# Patient Record
Sex: FEMALE | Race: NATIVE HAWAIIAN OR OTHER PACIFIC ISLANDER | Employment: PART TIME | URBAN - METROPOLITAN AREA
[De-identification: names, ages, dates, MRNs, and addresses within clinical notes are randomized per-mention and may not be internally consistent; named-entity substitution may affect disease eponyms.]

---

## 2019-02-28 ENCOUNTER — HOSPITAL ENCOUNTER (OUTPATIENT)
Dept: MAMMOGRAPHY | Age: 64
Discharge: HOME | End: 2019-03-05
Payer: COMMERCIAL

## 2019-02-28 DIAGNOSIS — Z12.31 VISIT FOR SCREENING MAMMOGRAM: ICD-10-CM

## 2019-02-28 PROCEDURE — 77067 SCR MAMMO BI INCL CAD: CPT

## 2021-01-07 ENCOUNTER — OFFICE VISIT (OUTPATIENT)
Dept: PRIMARY CARE CLINIC | Age: 66
End: 2021-01-07
Payer: COMMERCIAL

## 2021-01-07 DIAGNOSIS — Z20.828 EXPOSURE TO SARS-ASSOCIATED CORONAVIRUS: Primary | ICD-10-CM

## 2021-01-07 PROCEDURE — G8420 CALC BMI NORM PARAMETERS: HCPCS | Performed by: NURSE PRACTITIONER

## 2021-01-07 PROCEDURE — 99211 OFF/OP EST MAY X REQ PHY/QHP: CPT | Performed by: NURSE PRACTITIONER

## 2021-01-07 PROCEDURE — G8428 CUR MEDS NOT DOCUMENT: HCPCS | Performed by: NURSE PRACTITIONER

## 2021-01-07 NOTE — PATIENT INSTRUCTIONS

## 2021-01-08 LAB — SARS-COV-2: NOT DETECTED

## 2021-03-12 NOTE — PROGRESS NOTES
Patient reached ____ yes  __X___ no   VM instructions left __X__ yes   phone number _[phone]_______                                ____ no-office notified          Date _3/23/21________  Time _1100______  Arrival _0930_____    Nothing to eat or drink after midnight-follow your doctors prep instructions-this may include taking a second dose of your prep after midnight  Responsible adult 25 or older to stay on site while you are here-drive you home-stay with you after  Follow any instructions your doctors office has given you  Bring a complete list of all your medications and supplements including name,dose,how often taken the day of your procedure  If you normally take the following medications in the morning please do so the AM of your procedure with a small sip of water       Heart,blood pressure,seizure,thyroid or breathing medications-use your inhalers       DO NOT take blood pressure medications ending in \"nidhi\" or \"pril\" the AM of procedure or evening prior  Take half or your normal dose of any long acting insulins the night before your procedure-do not take any diabetic medications the AM of procedure  Follow your doctors instructions regarding stopping or taking  any blood thinners-if you do not have instructions-call them  Any questions call your doctor  Other ______________________________________________________________      COVID TEST     __ done- where ____  __ scheduled _____ where ___  _X_ other _ PT TO SCHEDULE FOR 3/15/21 OR 3/16/21_________        VISITOR POLICY(subject to change)         There is a one visitor policy at [facility] for all surgeries and endoscopies. Whether the visitor can stay or will be asked to wait in the car will depend on the current policy and if social distancing can be maintained. The policy is subject to change at any time. Please make sure the visitor has a cell phone that is on,charged and able to accept calls, as this may be the way that the staff communicates with them.Pain management is NO VISITOR policyThe patients ride is expected to remain in the car with a cell phone for communication. If the ride is leaving the hospital grounds please make sure they are back in time for pickup. Have the patient inform the staff on arrival what their rides plans are while the patient is in the facility. At the MAIN there is one visitor allowed. Please note that the visitor policy is subject to change.

## 2021-03-23 ENCOUNTER — HOSPITAL ENCOUNTER (OUTPATIENT)
Age: 66
Setting detail: OUTPATIENT SURGERY
Discharge: HOME | End: 2021-03-23
Attending: SURGERY | Admitting: SURGERY
Payer: COMMERCIAL

## 2021-03-23 ENCOUNTER — ANESTHESIA EVENT (OUTPATIENT)
Dept: ENDOSCOPY | Age: 66
End: 2021-03-23
Payer: COMMERCIAL

## 2021-03-23 ENCOUNTER — ANESTHESIA (OUTPATIENT)
Dept: ENDOSCOPY | Age: 66
End: 2021-03-23
Payer: COMMERCIAL

## 2021-03-23 VITALS
OXYGEN SATURATION: 99 % | WEIGHT: 110 LBS | HEART RATE: 58 BPM | RESPIRATION RATE: 13 BRPM | SYSTOLIC BLOOD PRESSURE: 156 MMHG | BODY MASS INDEX: 21.6 KG/M2 | DIASTOLIC BLOOD PRESSURE: 55 MMHG | TEMPERATURE: 97.5 F | HEIGHT: 60 IN

## 2021-03-23 VITALS
OXYGEN SATURATION: 100 % | RESPIRATION RATE: 20 BRPM | SYSTOLIC BLOOD PRESSURE: 123 MMHG | DIASTOLIC BLOOD PRESSURE: 57 MMHG

## 2021-03-23 LAB — SARS-COV-2, NAAT: NOT DETECTED

## 2021-03-23 PROCEDURE — 3609010300 HC COLONOSCOPY W/BIOPSY SINGLE/MULTIPLE: Performed by: SURGERY

## 2021-03-23 PROCEDURE — 7100000000 HC PACU RECOVERY - FIRST 15 MIN: Performed by: SURGERY

## 2021-03-23 PROCEDURE — 7100000011 HC PHASE II RECOVERY - ADDTL 15 MIN: Performed by: SURGERY

## 2021-03-23 PROCEDURE — 2580000003 HC RX 258: Performed by: NURSE ANESTHETIST, CERTIFIED REGISTERED

## 2021-03-23 PROCEDURE — 3700000001 HC ADD 15 MINUTES (ANESTHESIA): Performed by: SURGERY

## 2021-03-23 PROCEDURE — 6360000002 HC RX W HCPCS: Performed by: NURSE ANESTHETIST, CERTIFIED REGISTERED

## 2021-03-23 PROCEDURE — 87635 SARS-COV-2 COVID-19 AMP PRB: CPT

## 2021-03-23 PROCEDURE — 7100000010 HC PHASE II RECOVERY - FIRST 15 MIN: Performed by: SURGERY

## 2021-03-23 PROCEDURE — 2500000003 HC RX 250 WO HCPCS: Performed by: NURSE ANESTHETIST, CERTIFIED REGISTERED

## 2021-03-23 PROCEDURE — 7100000001 HC PACU RECOVERY - ADDTL 15 MIN: Performed by: SURGERY

## 2021-03-23 PROCEDURE — 2709999900 HC NON-CHARGEABLE SUPPLY: Performed by: SURGERY

## 2021-03-23 PROCEDURE — 88305 TISSUE EXAM BY PATHOLOGIST: CPT

## 2021-03-23 PROCEDURE — 3700000000 HC ANESTHESIA ATTENDED CARE: Performed by: SURGERY

## 2021-03-23 RX ORDER — PROPOFOL 10 MG/ML
INJECTION, EMULSION INTRAVENOUS CONTINUOUS PRN
Status: DISCONTINUED | OUTPATIENT
Start: 2021-03-23 | End: 2021-03-23 | Stop reason: SDUPTHER

## 2021-03-23 RX ORDER — LIDOCAINE HYDROCHLORIDE 20 MG/ML
INJECTION, SOLUTION INFILTRATION; PERINEURAL PRN
Status: DISCONTINUED | OUTPATIENT
Start: 2021-03-23 | End: 2021-03-23 | Stop reason: SDUPTHER

## 2021-03-23 RX ORDER — PROPOFOL 10 MG/ML
INJECTION, EMULSION INTRAVENOUS PRN
Status: DISCONTINUED | OUTPATIENT
Start: 2021-03-23 | End: 2021-03-23 | Stop reason: SDUPTHER

## 2021-03-23 RX ORDER — SODIUM CHLORIDE 9 MG/ML
INJECTION, SOLUTION INTRAVENOUS CONTINUOUS PRN
Status: DISCONTINUED | OUTPATIENT
Start: 2021-03-23 | End: 2021-03-23 | Stop reason: SDUPTHER

## 2021-03-23 RX ADMIN — PROPOFOL 100 MG: 10 INJECTION, EMULSION INTRAVENOUS at 11:41

## 2021-03-23 RX ADMIN — LIDOCAINE HYDROCHLORIDE 100 MG: 20 INJECTION, SOLUTION INFILTRATION; PERINEURAL at 11:35

## 2021-03-23 RX ADMIN — SODIUM CHLORIDE: 9 INJECTION, SOLUTION INTRAVENOUS at 11:14

## 2021-03-23 RX ADMIN — PROPOFOL 50 MG: 10 INJECTION, EMULSION INTRAVENOUS at 11:35

## 2021-03-23 RX ADMIN — PROPOFOL 160 MCG/KG/MIN: 10 INJECTION, EMULSION INTRAVENOUS at 11:35

## 2021-03-23 NOTE — PROGRESS NOTES
Patient's  is here. Pt dc'd per wheelchair. Patient discharged home with belongings.  taking stable pt home.

## 2021-03-23 NOTE — H&P
Subjective:     Bartolo Peters is a 72 y.o. female who was referred for screening colonoscopy    Patient's medications, allergies, past medical, surgical, social and family histories were reviewed and updated as appropriate. Past medical history:  has a past medical history of Anesthesia, Prediabetes, and Thyroid disease. Past surgical history:  has a past surgical history that includes  section. Past social history:  reports that she has never smoked. She does not have any smokeless tobacco history on file. She reports previous alcohol use. She reports previous drug use. Past family history: family history is not on file. Allergies: No Known Allergies  Current medications: No current facility-administered medications for this encounter. Facility-Administered Medications Ordered in Other Encounters:     0.9 % sodium chloride infusion, , , Continuous PRN, Donna Pop, KAYLEY - CRNA, New Bag at 21 1114    Review of Systems  A comprehensive review of systems was negative. Objective:     BP (!) 171/60   Pulse 64   Temp 97.5 °F (36.4 °C) (Temporal)   Resp 18   Ht 5' (1.524 m)   Wt 110 lb (49.9 kg)   SpO2 100%   BMI 21.48 kg/m²   General appearance: alert, appears stated age and cooperative  Eyes: conjunctivae/corneas clear. PERRL, EOM's intact. Fundi benign. Ears: normal TM's and external ear canals both ears  Heart: regular rate and rhythm, S1, S2 normal, no murmur, click, rub or gallop  Abdomen: soft, non-tender; bowel sounds normal; no masses,  no organomegaly  Lungs: clear to auscultation bilaterally  Rectal: normal tone    Plan:     1. Colonoscopy.

## 2021-03-23 NOTE — PROCEDURES
[facility]                     [address] [facility], [address] [facility]                               COLONOSCOPY REPORT    PATIENT NAME: Colleen Altamirano                       :        1955  MED REC NO:   [id number]                          ROOM:  ACCOUNT NO:   [de-identified]                           ADMIT DATE: 2021  PROVIDER:     Ivania Sinha MD    DATE OF PROCEDURE:  2021    PREOPERATIVE DIAGNOSIS:  Screening for colon cancer. POSTOPERATIVE DIAGNOSES:  Sigmoid colon polyps and diverticulosis of the  sigmoid colon. SURGEON:  Ivania Sinha MD    SEDATION:  MAC. BLOOD LOSS:  Minimal.    OPERATIVE PROCEDURE:  The patient was placed in the left lateral  position. Olympus colonoscope was inserted all the way up to the cecum. Cecum and ileocecal valve were within normal limits. Coming back, the  whole colon was examined. Her prep was good. She had moderate  diverticulosis of the sigmoid colon. There were two tiny 5 mm polyps  seen, which were hyperplastic polyps, were removed with biopsy forceps. No cancer was seen. Colon mucosa was normal.  She tolerated the  procedure well and left the endoscopy room in satisfactory condition.         Charbel Gordillo MD    D: 2021 12:12:59       T: 2021 12:44:51     [id number]  Job#: [id number]     Doc#: [id number]    CC:  Celina Mota MD

## 2021-03-23 NOTE — ANESTHESIA PRE PROCEDURE
Department of Anesthesiology  Preprocedure Note       Name:  Oneida Civil   Age:  72 y.o.  :  1955                                          MRN:  [id number]         Date:  3/23/2021      Surgeon: Hal Partida):  Darryle Cool, MD    Procedure: Procedure(s):  COLONOSCOPY DIAGNOSTIC    Medications prior to admission:   Prior to Admission medications    Medication Sig Start Date End Date Taking? Authorizing Provider   levothyroxine (SYNTHROID) 75 MCG tablet Take 75 mcg by mouth Daily    Historical Provider, MD   NONFORMULARY Chinese herb daily    Historical Provider, MD       Current medications:    No current facility-administered medications for this encounter. Allergies:  No Known Allergies    Problem List:  There is no problem list on file for this patient. Past Medical History:        Diagnosis Date    Anesthesia     states \"almost \" due to too much sedation with childbirth    Prediabetes     Thyroid disease     hypothyroidism       Past Surgical History:        Procedure Laterality Date     SECTION      x2       Social History:    Social History     Tobacco Use    Smoking status: Not on file   Substance Use Topics    Alcohol use: Not on file                                Counseling given: Not Answered      Vital Signs (Current): There were no vitals filed for this visit.                                            BP Readings from Last 3 Encounters:   No data found for BP       NPO Status:                                                                                 BMI:   Wt Readings from Last 3 Encounters:   No data found for Wt     There is no height or weight on file to calculate BMI.    CBC: No results found for: WBC, RBC, HGB, HCT, MCV, RDW, PLT    CMP:   Lab Results   Component Value Date     2012    K 4.1 2012     2012    CO2 25 2012    BUN 10 2012    CREATININE 0.7 2012    GFRAA >60 2012    AGRATIO 1.1 2012 GLUCOSE 121 03/29/2012    PROT 7.6 03/29/2012    CALCIUM 9.3 03/29/2012    BILITOT 1.00 03/29/2012    ALKPHOS 79 03/29/2012    AST 34 03/29/2012    ALT 34 03/29/2012       POC Tests: No results for input(s): POCGLU, POCNA, POCK, POCCL, POCBUN, POCHEMO, POCHCT in the last 72 hours. Coags: No results found for: PROTIME, INR, APTT    HCG (If Applicable): No results found for: PREGTESTUR, PREGSERUM, HCG, HCGQUANT     ABGs: No results found for: PHART, PO2ART, IRZ6ZRU, OEY3QRX, BEART, J9NXKZBG     Type & Screen (If Applicable):  No results found for: LABABO, LABRH    Drug/Infectious Status (If Applicable):  No results found for: HIV, HEPCAB    COVID-19 Screening (If Applicable):   Lab Results   Component Value Date    COVID19 Not Detected 01/07/2021           Anesthesia Evaluation  Patient summary reviewed and Nursing notes reviewed history of anesthetic complications (hx total spinal during delivery): Airway: Mallampati: II  TM distance: >3 FB   Neck ROM: full  Mouth opening: > = 3 FB Dental:          Pulmonary:                              Cardiovascular:  Exercise tolerance: poor (<4 METS),                     Neuro/Psych:               GI/Hepatic/Renal:             Endo/Other:                     Abdominal:           Vascular:                                        Anesthesia Plan      MAC     ASA 2       Induction: intravenous. MIPS: Prophylactic antiemetics administered. Anesthetic plan and risks discussed with patient. Plan discussed with CRNA.                   Sridhar Benz MD   3/23/2021

## 2021-03-23 NOTE — PROGRESS NOTES
Pt drove herself to procedure. Pt was unaware she will need ride home. Pt informed she can not drive home if she receives sedation. Pt called and confirmed with her  that he will come pick her up, and will be able to stay with her at home. Charge nurse aware of situation. 's phone number placed in paper chart.

## 2021-03-23 NOTE — ANESTHESIA POSTPROCEDURE EVALUATION
Department of Anesthesiology  Postprocedure Note    Patient: Heather Soliman  MRN: [id number]  YOB: 1955  Date of evaluation: 3/23/2021  Time:  12:10 PM     Procedure Summary     Date: 03/23/21 Room / Location: [address] [place] [address]    Anesthesia Start: 1130 Anesthesia Stop: 9216    Procedure: COLONOSCOPY POLYPECTOMY SNARE/COLD BIOPSY (N/A ) Diagnosis: (SCREENING Z12.11)    Surgeons: Luba Pascal MD Responsible Provider: Melissa Shepherd MD    Anesthesia Type: MAC ASA Status: 2          Anesthesia Type: MAC    Berna Phase I: Berna Score: 9    Berna Phase II:      Last vitals: Reviewed and per EMR flowsheets.        Anesthesia Post Evaluation    Patient location during evaluation: PACU  Patient participation: complete - patient participated  Level of consciousness: awake  Airway patency: patent  Nausea & Vomiting: no vomiting  Complications: no  Cardiovascular status: hemodynamically stable  Respiratory status: acceptable  Hydration status: euvolemic

## 2021-03-23 NOTE — BRIEF OP NOTE
Brief Postoperative Note      Patient: Francisco Pollard  YOB: 1955  MRN: [id number]    Date of Procedure: 3/23/2021    Pre-Op Diagnosis: SCREENING Z12.11    Post-Op Diagnosis: Same plus sigmoid polyps and diverticulosiis of sigmoid colon       Procedure(s):  COLONOSCOPY POLYPECTOMY SNARE/COLD BIOPSY  colonscopy up to cecum with biopsy polypectomies of sigmoid colon  Surgeon(s):  Ruth Awad MD    Assistant:  * No surgical staff found *    Anesthesia: Monitor Anesthesia Care    Estimated Blood Loss (mL): Minimal    Complications: None    Specimens:   ID Type Source Tests Collected by Time Destination   A : Bx- sigmoid colon polyp Tissue Tissue SURGICAL PATHOLOGY Ruth Awad MD 3/23/2021 1155        Implants:  * No implants in log *      Drains: * No LDAs found *    Findings: as above    Electronically signed by Ruth Awad MD on 3/23/2021 at 11:59 AM

## 2021-03-23 NOTE — PROGRESS NOTES
Discharge instructions reviewed with patient, pt alert and oreinted. All home medications have been reviewed, questions answered and patient verbalized understanding. Discharge instructions signed.   Waiting for patient's  to arrive at the hospital.

## 2021-03-23 NOTE — PROGRESS NOTES
Patient arrived from endo to pacu. On room air. SB on the monitor.  VSS    Electronically signed by Rehan Sanchez RN on 3/23/2021 at 9030

## 2023-06-20 ENCOUNTER — HOSPITAL ENCOUNTER (OUTPATIENT)
Dept: PHYSICAL THERAPY | Age: 68
Setting detail: THERAPIES SERIES
Discharge: HOME | End: 2023-06-20
Payer: COMMERCIAL

## 2023-06-20 PROCEDURE — 97112 NEUROMUSCULAR REEDUCATION: CPT

## 2023-06-20 PROCEDURE — 97116 GAIT TRAINING THERAPY: CPT

## 2023-06-20 PROCEDURE — 97110 THERAPEUTIC EXERCISES: CPT

## 2023-06-20 NOTE — FLOWSHEET NOTE
transfers, ambulation, and ascending / descending stairs. Charges:  Timed Code Treatment Minutes: 43   Total Treatment Minutes: 43     [] EVAL - LOW (49446)   [] EVAL - MOD (60712)  [] EVAL - HIGH (44077)  [] RE-EVAL (30683)  [x] TE (77466) x  1    [] Ionto  [x] NMR (32971) x  1    [] Vaso  [] Manual (14522) x      [] Ultrasound  [] TA x       [] Mech Traction (05421)  [x] Gait Training x  1   [] ES (un) (89614):   [] Aquatic therapy x   [] Other:   [] Group:     GOALS:   Patient stated goal: improve walking   [] Progressing: [] Met: [] Not Met: [] Adjusted     Therapist goals for Patient:   Short Term Goals: To be achieved in: 2 weeks  1. Independent in HEP and progression per patient tolerance, in order to prevent re-injury. [] Progressing: [] Met: [] Not Met: [] Adjusted  2. Patient will have a decrease in pain to facilitate improvement in movement, function, and ADLs as indicated by Functional Deficits. [] Progressing: [] Met: [] Not Met: [] Adjusted     Long Term Goals: To be achieved in: 8 weeks  1. Patient to improve TUG time 18 sec or less to demonstrate improved fall risk to assist with reaching prior level of function. [] Progressing: [] Met: [] Not Met: [] Adjusted  2. Patient will improve 5x STS to 12 sec or less to demonstrate increased LE endurance for improved functional mobility. [] Progressing: [] Met: [] Not Met: [] Adjusted  3. Patient will return to functional activities including climbing 1 flight of stairs with reciprocal pattern and 1 HR to improve safety and independence in the home. [] Progressing: [] Met: [] Not Met: [] Adjusted  4. Patient will be able to ambulate at least 600 feet without AD and SBA/SUP to progress to full time independent ambulation. [] Progressing: [] Met: [] Not Met: [] Adjusted         Overall Progression Towards Functional goals/ Treatment Progress Update:  [] Patient is progressing as expected towards functional goals listed.     [] Progression is

## 2023-06-21 ENCOUNTER — HOSPITAL ENCOUNTER (OUTPATIENT)
Dept: PHYSICAL THERAPY | Age: 68
Setting detail: THERAPIES SERIES
Discharge: HOME | End: 2023-06-21
Payer: COMMERCIAL

## 2023-06-21 PROCEDURE — 97112 NEUROMUSCULAR REEDUCATION: CPT

## 2023-06-21 PROCEDURE — 97110 THERAPEUTIC EXERCISES: CPT

## 2023-06-21 NOTE — FLOWSHEET NOTE
[address] [facility] Physical Therapy  Phone: [phone]   Fax: [phone]    Physical Therapy Daily Treatment Note    Date:  2023     Patient Name:  Lianna Kay    :  1955  MRN: [id number]  Medical Diagnosis:  Other cerebral infarction [I63.89]  Treatment Diagnosis: L LE weakness and decreased ROM s/p CVA, impaired gait and balance, difficulty with stairs, increased risk for falls, decreased durance, assistance required for gait and ADLs  Insurance/Certification information:  PT Insurance Information: [facility] Choice Plus: no copay, no auth, 60 visits combined HARD MAX; 85% coinsurance  Physician Information:  Priya Valente MD    Plan of care signed (Y/N): []  Yes [x]  No     Date of Patient follow up with Physician:      Progress Report: []  Yes  [x]  No     Date Range for reporting period:  Beginnin2023  Ending:       Progress report due (10 Rx/or 30 days whichever is less): visit #10 or 3/68/88    Recertification due (POC duration/ or 90 days whichever is less): visit #16 or 23     Visit # Insurance Allowable Auth required? Date Range   3/16 60 visits combined   HARD MAX []  Yes  [x]  No NA     Latex Allergy:  [x]NO      []YES  Preferred Language for Healthcare:   [x]English       []Other:    Functional Scale/Test Evaluation 2023 30 day  60 day  Discharge    Tinetti Assessment        TUG 37.7 sec      5X sts 15.8 sec          Pain level:  0/10  Location:  n/a    SUBJECTIVE:  Patient reports she was not sore last night.       OBJECTIVE:       RESTRICTIONS/PRECAUTIONS: L ankle DF assist brace, L arm hypotonia     Interventions/Exercises:   Therapeutic Exercises (10531) Resistance / level Sets/sec Reps Notes   Scifit stepper with UEs L 1 4 min  , : PT assist with keeping L hand on handle   IB   HR  2 x 30\" B  30\"     Supine:  - AA L hip flexion  - bridge w/ shoulder flexion  - bridge with hip abd        Lime TB    2  2  2   10  10  10

## 2023-06-27 ENCOUNTER — HOSPITAL ENCOUNTER (OUTPATIENT)
Dept: OCCUPATIONAL THERAPY | Age: 68
Setting detail: THERAPIES SERIES
Discharge: HOME | End: 2023-06-27
Payer: COMMERCIAL

## 2023-06-27 ENCOUNTER — HOSPITAL ENCOUNTER (OUTPATIENT)
Dept: PHYSICAL THERAPY | Age: 68
Setting detail: THERAPIES SERIES
Discharge: HOME | End: 2023-06-27
Payer: COMMERCIAL

## 2023-06-27 PROCEDURE — 97116 GAIT TRAINING THERAPY: CPT

## 2023-06-27 PROCEDURE — 97112 NEUROMUSCULAR REEDUCATION: CPT

## 2023-06-27 PROCEDURE — 97110 THERAPEUTIC EXERCISES: CPT

## 2023-06-29 ENCOUNTER — HOSPITAL ENCOUNTER (OUTPATIENT)
Dept: PHYSICAL THERAPY | Age: 68
Setting detail: THERAPIES SERIES
Discharge: HOME | End: 2023-06-29
Payer: COMMERCIAL

## 2023-06-29 ENCOUNTER — HOSPITAL ENCOUNTER (OUTPATIENT)
Dept: OCCUPATIONAL THERAPY | Age: 68
Setting detail: THERAPIES SERIES
Discharge: HOME | End: 2023-06-29
Payer: COMMERCIAL

## 2023-06-29 PROCEDURE — 97116 GAIT TRAINING THERAPY: CPT

## 2023-06-29 PROCEDURE — 97112 NEUROMUSCULAR REEDUCATION: CPT

## 2023-06-29 PROCEDURE — 97110 THERAPEUTIC EXERCISES: CPT

## 2023-06-30 ENCOUNTER — HOSPITAL ENCOUNTER (OUTPATIENT)
Dept: OCCUPATIONAL THERAPY | Age: 68
Setting detail: THERAPIES SERIES
Discharge: HOME | End: 2023-06-30
Payer: COMMERCIAL

## 2023-06-30 ENCOUNTER — HOSPITAL ENCOUNTER (OUTPATIENT)
Dept: PHYSICAL THERAPY | Age: 68
Setting detail: THERAPIES SERIES
Discharge: HOME | End: 2023-06-30
Payer: COMMERCIAL

## 2023-06-30 PROCEDURE — 97116 GAIT TRAINING THERAPY: CPT

## 2023-06-30 PROCEDURE — 97535 SELF CARE MNGMENT TRAINING: CPT

## 2023-06-30 PROCEDURE — 97112 NEUROMUSCULAR REEDUCATION: CPT

## 2023-07-03 ENCOUNTER — HOSPITAL ENCOUNTER (OUTPATIENT)
Dept: OCCUPATIONAL THERAPY | Age: 68
Setting detail: THERAPIES SERIES
Discharge: HOME | End: 2023-07-03
Payer: COMMERCIAL

## 2023-07-03 ENCOUNTER — HOSPITAL ENCOUNTER (OUTPATIENT)
Dept: PHYSICAL THERAPY | Age: 68
Setting detail: THERAPIES SERIES
Discharge: HOME | End: 2023-07-03
Payer: COMMERCIAL

## 2023-07-03 PROCEDURE — 97530 THERAPEUTIC ACTIVITIES: CPT

## 2023-07-03 PROCEDURE — 97112 NEUROMUSCULAR REEDUCATION: CPT

## 2023-07-03 PROCEDURE — 97110 THERAPEUTIC EXERCISES: CPT

## 2023-07-03 PROCEDURE — 97116 GAIT TRAINING THERAPY: CPT

## 2023-07-03 PROCEDURE — 97535 SELF CARE MNGMENT TRAINING: CPT

## 2023-07-03 NOTE — FLOWSHEET NOTE
[address] [facility] Physical Therapy  Phone: [phone]   Fax: [phone]    Physical Therapy Daily Treatment Note    Date:  2023     Patient Name:  Nikki Romero    :  1955  MRN: [id number]  Medical Diagnosis:  Other cerebral infarction [I63.89]  Treatment Diagnosis: L LE weakness and decreased ROM s/p CVA, impaired gait and balance, difficulty with stairs, increased risk for falls, decreased durance, assistance required for gait and ADLs  Insurance/Certification information:  PT Insurance Information: [facility] Choice Plus: no copay, no auth, 60 visits combined HARD MAX; 85% coinsurance  Physician Information:  Dagmar Parson MD    Plan of care signed (Y/N): []  Yes [x]  No     Date of Patient follow up with Physician:      Progress Report: []  Yes  [x]  No     Date Range for reporting period:  Beginnin2023  Ending:       Progress report due (10 Rx/or 30 days whichever is less): visit #10 or     Recertification due (POC duration/ or 90 days whichever is less): visit #16 or 23     Visit # Insurance Allowable Auth required? Date Range    60 visits combined   HARD MAX []  Yes  [x]  No NA     Latex Allergy:  [x]NO      []YES  Preferred Language for Healthcare:   [x]English       []Other:    Functional Scale/Test Evaluation 2023 30 day  60 day  Discharge    Tinetti Assessment        TUG 37.7 sec      5X sts 15.8 sec          Pain level:  0/10  Location:  n/a    SUBJECTIVE:  Patient reports her L arm needed \"heavy work\" and OT did that for her.  Went out to eat yesterday a     OBJECTIVE:       RESTRICTIONS/PRECAUTIONS: L ankle DF assist brace, L arm hypotonia     Interventions/Exercises:   Therapeutic Exercises (42329) Resistance / level Sets/sec Reps Notes   Scifit stepper with UEs , : PT assist with keeping L hand on handle   IB   HR    Supine:  - AA L hip flexion  - bridge w/ shoulder flexion  - bridge with hip abd    : very

## 2023-07-05 ENCOUNTER — HOSPITAL ENCOUNTER (OUTPATIENT)
Dept: OCCUPATIONAL THERAPY | Age: 68
Setting detail: THERAPIES SERIES
Discharge: HOME | End: 2023-07-05
Payer: COMMERCIAL

## 2023-07-05 ENCOUNTER — HOSPITAL ENCOUNTER (OUTPATIENT)
Dept: PHYSICAL THERAPY | Age: 68
Setting detail: THERAPIES SERIES
Discharge: HOME | End: 2023-07-05
Payer: COMMERCIAL

## 2023-07-05 PROCEDURE — 97116 GAIT TRAINING THERAPY: CPT

## 2023-07-05 PROCEDURE — 97112 NEUROMUSCULAR REEDUCATION: CPT

## 2023-07-05 PROCEDURE — 97530 THERAPEUTIC ACTIVITIES: CPT

## 2023-07-05 PROCEDURE — 97110 THERAPEUTIC EXERCISES: CPT

## 2023-07-05 NOTE — FLOWSHEET NOTE
[address] [facility] Physical Therapy  Phone: [phone]   Fax: [phone]    Physical Therapy Daily Treatment Note    Date:  2023     Patient Name:  Yeimy Perez    :  1955  MRN: [id number]  Medical Diagnosis:  Other cerebral infarction [I63.89]  Treatment Diagnosis: L LE weakness and decreased ROM s/p CVA, impaired gait and balance, difficulty with stairs, increased risk for falls, decreased durance, assistance required for gait and ADLs  Insurance/Certification information:  PT Insurance Information: [facility] Choice Plus: no copay, no auth, 60 visits combined HARD MAX; 85% coinsurance  Physician Information:  José Miguel Hartley MD    Plan of care signed (Y/N): []  Yes [x]  No     Date of Patient follow up with Physician:      Progress Report: []  Yes  [x]  No     Date Range for reporting period:  Beginnin2023  Ending:       Progress report due (10 Rx/or 30 days whichever is less): visit #10 or 8/09/10    Recertification due (POC duration/ or 90 days whichever is less): visit #16 or 23     Visit # Insurance Allowable Auth required? Date Range    60 visits combined   HARD MAX []  Yes  [x]  No NA     Latex Allergy:  [x]NO      []YES  Preferred Language for Healthcare:   [x]English       []Other:    Functional Scale/Test Evaluation 2023 30 day  60 day  Discharge    Tinetti Assessment        TUG 37.7 sec      5X sts 15.8 sec          Pain level:  0/10  Location:  n/a    SUBJECTIVE:  Pt reports  OT worked her arm really well but her hand is taking a long time to come back. César Frame her bike yesterday at home and was able to go around in a full revolution.       OBJECTIVE:       RESTRICTIONS/PRECAUTIONS: L ankle DF assist brace, L arm hypotonia     Interventions/Exercises:   Therapeutic Exercises (40469) Resistance / level Sets/sec Reps Notes   Scifit stepper with UEs , : PT assist with keeping L hand on handle   IB   HR    Supine:  - AA L hip

## 2023-07-05 NOTE — FLOWSHEET NOTE
swelling/inflammation/restriction, improving soft tissue extensibility and allowing for proper ROM for normal function with self care, reaching, carrying, lifting, house/yardwork, driving/computer work  [] Comments:    ADL Training:  [x] (78729) Provided self-care/home management training related to activities of daily living and compensatory training, and/or use of adaptive equipment   [] Comments:     Splinting:  [] Fabrication of:   [] (00448) Orthotic/Prosthetic Management, subsequent encounter  [] (97118) Orthotic management and training (fitting and assessment)  [] Comments:      Charges:  Timed Code Treatment Minutes: 45   Total Treatment Minutes: 45     [] EVAL (LOW) 05056   [] OT Re-eval (25727)  [] EVAL (MOD) 97510   [] EVAL (HIGH) 0496 97 06 31       [] Sabas (68581) x     [] CYGHK(61285)  [x] NMR (91583) x    2 [] Estim (attended) (00596)   [] Manual ([id number]) x     [] US (49508)  [] TA () x     [] Paraffin (21875)  [x] ADL  (88 [phone] 60) x   1 [] Splint/L code:    [] Estim (unattended) (22 [id number])  [] Fluidotherapy (27878)  [] Other:    GOALS:  Patient stated goal: improve function in left ue and drive again  [x] Progressing: [] Met: [] Not Met: [] Adjusted     Therapist goals for Patient:   Short Term Goals: To be achieved in: 30 days  1. Pt will use left ue as functional stabilizer during games with family   [x] Progressing: [] Met: [] Not Met: [] Adjusted  2. Pt will demonstrate use of left ue as bimanual assist carrying objects during ADL and IADL  [x] Progressing: [] Met: [] Not Met: [] Adjusted     Long Term Goals: To be achieved by discharge     2. Pt will report a QuickDASH Symptom Severity Scale score of  50% or less indicating increased safety and functional independence in desired occupational pursuits by discharge. [x] Progressing: [] Met: [] Not Met: [] Adjusted       Progression Towards Functional goals:  [x] Patient is progressing as expected towards functional goals listed.     [] Progression is slowed

## 2023-07-07 ENCOUNTER — HOSPITAL ENCOUNTER (OUTPATIENT)
Dept: PHYSICAL THERAPY | Age: 68
Setting detail: THERAPIES SERIES
Discharge: HOME | End: 2023-07-07
Payer: COMMERCIAL

## 2023-07-07 ENCOUNTER — HOSPITAL ENCOUNTER (OUTPATIENT)
Dept: OCCUPATIONAL THERAPY | Age: 68
Setting detail: THERAPIES SERIES
Discharge: HOME | End: 2023-07-07
Payer: COMMERCIAL

## 2023-07-07 PROCEDURE — 97110 THERAPEUTIC EXERCISES: CPT

## 2023-07-07 PROCEDURE — 97112 NEUROMUSCULAR REEDUCATION: CPT

## 2023-07-07 PROCEDURE — 97116 GAIT TRAINING THERAPY: CPT

## 2023-07-07 NOTE — FLOWSHEET NOTE
[address] [facility] Occupational Therapy  [address] [facility] [address] [place] [address], [facility]  Phone: [phone]   Fax: [phone]      Occupational Therapy Daily Treatment Note  Date:  2023    Patient: Sophia Culver   : 1955   MRN: [id number]  Referring Physician: Dr. Krystle Potter MD           Medical Diagnosis Information:  left hemiplegia    Date of Injury: 23  Date of Surgery:  n/a                                      Insurance information: [place] of care sent to provider:      []Faxed   [x]Co-signature    (attempts: 1[] 2[] 3[])       Progress Report: []  Yes  [x]  No     Date Range for reporting period:  Beginnin2023  Ending: end of POC    Progress report due (10 Rx/or 30 days whichever is less): visit #10 or 23, DUE next session    Recertification due (POC duration/ or 90 days whichever is less): visit #18 or 23     Visit # Insurance Allowable Auth required? Date Range    60 combined all therapy discliplines []  Yes  [x]  No N/a         Latex Allergy:  [x]No      []Yes  Pacemaker:  [x] No       [] Yes     Preferred Language for Healthcare:   [x]English       []other:    Pain level:  1/10, L anterior shoulder     SUBJECTIVE:  Pt reports her shoulder has been feeling better. Functional Disability Index:  Quick DASH: pt has completed at home and will bring to next session (23)    OBJECTIVE: See eval      RESTRICTIONS/PRECAUTIONS: none noted on order     Exercises/Interventions:  Treatment focus on inhibition of abnormal movement patterns and neuromuscular control to improve LUE ROM, strength, and functional use.     Warm up 4 minutes UBE in stance with cues to decrease L shoulder elevation and increase force of hand grasp  In stance at stairs, pt grasped hand rail with L hand to complete slides up/down with cues to avoid shoulder elevation, IR, and abduction, progressing to staggered stance on steps; much improved activation of

## 2023-07-07 NOTE — FLOWSHEET NOTE
454 [facility] Physical Therapy  Phone: [phone]   Fax: [phone]    Physical Therapy Daily Treatment Note    Date:  2023     Patient Name:  Bandar Chambers    :  1955  MRN: [id number]  Medical Diagnosis:  Other cerebral infarction [I63.89]  Treatment Diagnosis: L LE weakness and decreased ROM s/p CVA, impaired gait and balance, difficulty with stairs, increased risk for falls, decreased durance, assistance required for gait and ADLs  Insurance/Certification information:  PT Insurance Information: [facility] Choice Plus: no copay, no auth, 60 visits combined HARD MAX; 85% coinsurance  Physician Information:  Manan Capps MD    Plan of care signed (Y/N): []  Yes [x]  No     Date of Patient follow up with Physician:      Progress Report: []  Yes  [x]  No     Date Range for reporting period:  Beginnin2023  Ending:       Progress report due (10 Rx/or 30 days whichever is less): visit #10 or 04    Recertification due (POC duration/ or 90 days whichever is less): visit #16 or 23     Visit # Insurance Allowable Auth required? Date Range    60 visits combined   HARD MAX []  Yes  [x]  No NA     Latex Allergy:  [x]NO      []YES  Preferred Language for Healthcare:   [x]English       []Other:    Functional Scale/Test Evaluation 2023 30 day  60 day  Discharge    Tinetti Assessment        TUG 37.7 sec      5X sts 15.8 sec          Pain level:  0/10  Location:  n/a    SUBJECTIVE:  Pt reports at home when riding her bike, she feels less \"jerk\" back of her L knee. She states it still happens but it is more mild.        OBJECTIVE:       RESTRICTIONS/PRECAUTIONS: L ankle DF assist brace, L arm hypotonia     Interventions/Exercises:   Therapeutic Exercises (90946) Resistance / level Sets/sec Reps Notes   Scifit stepper with UEs , : PT assist with keeping L hand on handle   IB   HR    Supine:  - AA L hip flexion  - bridge w/ shoulder flexion  -

## 2023-07-10 ENCOUNTER — HOSPITAL ENCOUNTER (OUTPATIENT)
Dept: OCCUPATIONAL THERAPY | Age: 68
Setting detail: THERAPIES SERIES
Discharge: HOME | End: 2023-07-10
Payer: COMMERCIAL

## 2023-07-10 ENCOUNTER — HOSPITAL ENCOUNTER (OUTPATIENT)
Dept: PHYSICAL THERAPY | Age: 68
Setting detail: THERAPIES SERIES
Discharge: HOME | End: 2023-07-10
Payer: COMMERCIAL

## 2023-07-10 PROCEDURE — 97112 NEUROMUSCULAR REEDUCATION: CPT

## 2023-07-10 PROCEDURE — 97116 GAIT TRAINING THERAPY: CPT

## 2023-07-10 PROCEDURE — 97530 THERAPEUTIC ACTIVITIES: CPT

## 2023-07-10 PROCEDURE — 97110 THERAPEUTIC EXERCISES: CPT

## 2023-07-10 NOTE — FLOWSHEET NOTE
achieved in: 2 weeks  1. Independent in HEP and progression per patient tolerance, in order to prevent re-injury. [] Progressing: [] Met: [] Not Met: [] Adjusted  2. Patient will have a decrease in pain to facilitate improvement in movement, function, and ADLs as indicated by Functional Deficits. [] Progressing: [] Met: [] Not Met: [] Adjusted     Long Term Goals: To be achieved in: 8 weeks  1. Patient to improve TUG time 18 sec or less to demonstrate improved fall risk to assist with reaching prior level of function. [] Progressing: [] Met: [] Not Met: [] Adjusted  2. Patient will improve 5x STS to 12 sec or less to demonstrate increased LE endurance for improved functional mobility. [] Progressing: [] Met: [] Not Met: [] Adjusted  3. Patient will return to functional activities including climbing 1 flight of stairs with reciprocal pattern and 1 HR to improve safety and independence in the home. [] Progressing: [] Met: [] Not Met: [] Adjusted  4. Patient will be able to ambulate at least 600 feet without AD and SBA/SUP to progress to full time independent ambulation. [] Progressing: [] Met: [] Not Met: [] Adjusted         Overall Progression Towards Functional goals/ Treatment Progress Update:  [] Patient is progressing as expected towards functional goals listed. [] Progression is slowed due to complexities/Impairments listed. [] Progression has been slowed due to co-morbidities.   [x] Plan just implemented, too soon to assess goals progression <30days   [] Goals require adjustment due to lack of progress  [] Patient is not progressing as expected and requires additional follow up with physician  [] Other    Persisting Functional Limitations/Impairments:  []Sleeping []Sitting               [x]Standing [x]Transfers        [x]Walking [x]Kneeling               [x]Stairs [x]Squatting / bending   [x]ADLs [x]Reaching  [x]Lifting  [x]Housework  [x]Driving []Job related

## 2023-07-10 NOTE — PROGRESS NOTES
9[facility] Occupational Therapy  30 [facility] [address] [place] [address], [facility]  Phone: [phone]   Fax: [phone]      Occupational Therapy Daily Treatment Note  Date:  7/10/2023    Patient: Joel Leblanc   : 1955   MRN: [id number]  Referring Physician: Dr. Kendal Narayan MD           Medical Diagnosis Information:  left hemiplegia    Date of Injury: 23  Date of Surgery:  n/a                                      Insurance information: [place] of care sent to provider:      []Faxed   [x]Co-signature    (attempts: 1[] 2[] 3[])       Progress Report: [x]  Yes  []  No     Date Range for reporting period:  Beginnin2023  Ending: end of POC    Progress report due (10 Rx/or 30 days whichever is less): visit #18 or     Recertification due (POC duration/ or 90 days whichever is less): visit #18 or 23     Visit # Insurance Allowable Auth required? Date Range    60 combined all therapy discliplines []  Yes  [x]  No N/a         Latex Allergy:  [x]No      []Yes  Pacemaker:  [x] No       [] Yes     Preferred Language for Healthcare:   [x]English       []other:    Pain level:  1/10, L anterior shoulder     SUBJECTIVE:  Pt reports folding laundry was very difficult on this date, but she wanted to try it as she has made improvements in L arm movement. Pt excited to show therapist ability to bring L hand to mouth.      Functional Disability Index:  7/10/22: Quick DASH: total score- 44, disability score- 75%     OBJECTIVE: See eval     AROM     L R   Shoulder Flexion  7/10/23: 0-72 with shoulder elevation and posterior lean  wnl   Elbow Flexion  7/10/23: 0-118      Supination   7/10/23: WFL     Wrist Flexion  7/10/23: 0-10 with hand offweighted     Wrist Extension   7/10/23: 0-21 with hand offweighted     CMC Abduction 7/10/23: Held in 25*    Composite Flexion (Tip of 3rd Digit to Distal Palmar Crease (cm))  7/10/23: 1 cm                          Hand

## 2023-07-12 ENCOUNTER — HOSPITAL ENCOUNTER (OUTPATIENT)
Dept: PHYSICAL THERAPY | Age: 68
Setting detail: THERAPIES SERIES
Discharge: HOME | End: 2023-07-12
Payer: COMMERCIAL

## 2023-07-12 ENCOUNTER — APPOINTMENT (OUTPATIENT)
Dept: OCCUPATIONAL THERAPY | Age: 68
End: 2023-07-12
Payer: MEDICARE

## 2023-07-12 PROCEDURE — 97112 NEUROMUSCULAR REEDUCATION: CPT

## 2023-07-12 PROCEDURE — 97116 GAIT TRAINING THERAPY: CPT

## 2023-07-12 NOTE — FLOWSHEET NOTE
Limitations/Impairments:  []Sleeping []Sitting               [x]Standing [x]Transfers        [x]Walking [x]Kneeling               [x]Stairs [x]Squatting / bending   [x]ADLs [x]Reaching  [x]Lifting  [x]Housework  [x]Driving []Job related tasks  [x]Sports/Recreation []Other:        ASSESSMENT: PN completed this date. Pt cut TUG time by 20 seconds and is close to meeting that LTG. She also has improved her Tinetti score by 8 points. She uses a walking stick for ambulation and has been working on walking short distances without an AD while maintaining good form/sequencing. She has improved her L knee flexion during gait and no longer compensates with hip circumduction or shoulder hiking. Will benefit from continued therapy to improve pt functional mobility s/p CVA. Treatment/Activity Tolerance:  [x] Patient able to complete tx [] Patient limited by fatigue  [] Patient limited by pain  [] Patient limited by other medical complications  [] Other:     Prognosis: [x] Good [] Fair  [] Poor    Patient Requires Follow-up: [x] Yes  [] No    Plan for next treatment session: initiate PT treatment focusing on balance and LE strength     PLAN: See frantz. PT 2x / week for 8 weeks. [x] Continue per plan of care [] Alter current plan (see comments)  [] Plan of care initiated [] Hold pending MD visit [] Discharge    Electronically signed by: Efe Menjivar PT DPT [facility]    Note: If patient does not return for scheduled/ recommended follow up visits, his note will serve as a discharge from care along with most recent update on progress.

## 2023-07-14 ENCOUNTER — HOSPITAL ENCOUNTER (OUTPATIENT)
Dept: PHYSICAL THERAPY | Age: 68
Setting detail: THERAPIES SERIES
Discharge: HOME | End: 2023-07-14
Payer: COMMERCIAL

## 2023-07-14 ENCOUNTER — HOSPITAL ENCOUNTER (OUTPATIENT)
Dept: OCCUPATIONAL THERAPY | Age: 68
Setting detail: THERAPIES SERIES
Discharge: HOME | End: 2023-07-14
Payer: COMMERCIAL

## 2023-07-14 PROCEDURE — 97530 THERAPEUTIC ACTIVITIES: CPT

## 2023-07-14 PROCEDURE — 97110 THERAPEUTIC EXERCISES: CPT

## 2023-07-14 PROCEDURE — 97112 NEUROMUSCULAR REEDUCATION: CPT

## 2023-07-14 NOTE — FLOWSHEET NOTE
9[facility] Physical Therapy  Phone: [phone]   Fax: [phone]    Physical Therapy Daily Treatment Note    Date:  2023     Patient Name:  Jose Tracy    :  1955  MRN: [id number]  Medical Diagnosis:  Other cerebral infarction [I63.89]  Treatment Diagnosis: L LE weakness and decreased ROM s/p CVA, impaired gait and balance, difficulty with stairs, increased risk for falls, decreased durance, assistance required for gait and ADLs  Insurance/Certification information:  PT Insurance Information: [facility] Choice Plus: no copay, no auth, 60 visits combined HARD MAX; 85% coinsurance  Physician Information:  German Gallo MD    Plan of care signed (Y/N): []  Yes [x]  No     Date of Patient follow up with Physician:      Progress Report: []  Yes  [x]  No     Date Range for reporting period:  Beginnin2023  PN: 23  Ending:       Progress report due (10 Rx/or 30 days whichever is less): visit #10 or     Recertification due (POC duration/ or 90 days whichever is less): visit #16 or 23     Visit # Insurance Allowable Auth required? Date Range    60 visits combined   HARD MAX []  Yes  [x]  No NA     Latex Allergy:  [x]NO      []YES  Preferred Language for Healthcare:   [x]English       []Other:    Functional Scale/Test Evaluation 2023 30 day   23 60 day  Discharge    Tinetti Assessment       TUG 37.7 sec 18.7 sec no AD     5X sts 15.8 sec 15.3 sec no UEs         Pain level:  0/10  Location:  n/a    SUBJECTIVE:  Patient reports that she walked into [employer] and all the way to the back of the store. She was very tired afterwards.       OBJECTIVE:   : See above       RESTRICTIONS/PRECAUTIONS: L ankle DF assist brace, L arm hypotonia     Interventions/Exercises:   Therapeutic Exercises (43162) Resistance / level Sets/sec Reps Notes   Scifit stepper with UEs , : PT assist with keeping L hand on handle   IB   HR

## 2023-07-14 NOTE — FLOWSHEET NOTE
975 [facility] Occupational Therapy  30 [facility] [address] [place] [address], [facility]  Phone: [phone]   Fax: [phone]      Occupational Therapy Daily Treatment Note  Date:  2023    Patient: Rianna Leblanc   : 1955   MRN: [id number]  Referring Physician: Dr. Anushka Arcos MD           Medical Diagnosis Information:  left hemiplegia    Date of Injury: 23  Date of Surgery:  n/a                                      Insurance information: [place] of care sent to provider:      []Faxed   [x]Co-signature    (attempts: 1[] 2[] 3[])       Progress Report: []  Yes  [x]  No     Date Range for reporting period:  Beginnin2023  Ending: end of POC    Progress report due (10 Rx/or 30 days whichever is less): visit #18 or     Recertification due (POC duration/ or 90 days whichever is less): visit #18 or 23     Visit # Insurance Allowable Auth required? Date Range    60 combined all therapy discliplines []  Yes  [x]  No N/a         Latex Allergy:  [x]No      []Yes  Pacemaker:  [x] No       [] Yes     Preferred Language for Healthcare:   [x]English       []other:    Pain level:  1/10, L anterior shoulder     SUBJECTIVE:  Pt reports mild pain in L shoulder.      Functional Disability Index:  7/10/22: Quick DASH: total score- 44, disability score- 75%     OBJECTIVE: See eval     AROM     L R   Shoulder Flexion  7/10/23: 0-72 with shoulder elevation and posterior lean  wnl   Elbow Flexion  7/10/23: 0-118      Supination   7/10/23: WFL     Wrist Flexion  7/10/23: 0-10 with hand offweighted     Wrist Extension   7/10/23: 0-21 with hand offweighted     CMC Abduction 7/10/23: Held in 25*    Composite Flexion (Tip of 3rd Digit to Distal Palmar Crease (cm))  7/10/23: 1 cm                          Hand Assessment Left  Right  Comments    9 Hole Peg Test (s) Unable  7/10/23: 3 min, 6 sec   7/10/23: L shoulder elevation and IR with R lateral lean; therapist holding

## 2023-07-17 ENCOUNTER — HOSPITAL ENCOUNTER (OUTPATIENT)
Dept: PHYSICAL THERAPY | Age: 68
Setting detail: THERAPIES SERIES
Discharge: HOME | End: 2023-07-17
Payer: MEDICARE

## 2023-07-17 ENCOUNTER — HOSPITAL ENCOUNTER (OUTPATIENT)
Dept: OCCUPATIONAL THERAPY | Age: 68
Setting detail: THERAPIES SERIES
Discharge: HOME | End: 2023-07-17
Payer: MEDICARE

## 2023-07-17 PROCEDURE — 97140 MANUAL THERAPY 1/> REGIONS: CPT

## 2023-07-17 PROCEDURE — 97112 NEUROMUSCULAR REEDUCATION: CPT

## 2023-07-17 PROCEDURE — 97116 GAIT TRAINING THERAPY: CPT

## 2023-07-17 NOTE — FLOWSHEET NOTE
mobility, lifting and ambulation. [] UE / Shoulder complex: self care, reaching, carrying, lifting, house/yardwork, driving, computer work. Modalities:  [] (07907) Vasopneumatic compression: Utilized vasopneumatic compression to decrease edema / swelling for the purpose of improving mobility and quad tone / recruitment which will allow for increased overall function including but not limited to self-care, transfers, ambulation, and ascending / descending stairs. Charges:  Timed Code Treatment Minutes: 45   Total Treatment Minutes: 45     [] EVAL - LOW (78184)   [] EVAL - MOD (86150)  [] EVAL - HIGH (12536)  [] RE-EVAL (27944)  [] TE (23955) x     [] Ionto  [x] NMR (55579) x  1    [] Vaso  [] Manual (45610) x      [] Ultrasound  [] TA x      [] Mech Traction (48464)  [x] Gait Training x 2   [] ES (un) (95949):   [] Aquatic therapy x   [] Other:   [] Group:     GOALS:   Patient stated goal: improve walking   [] Progressing: [] Met: [] Not Met: [] Adjusted     Therapist goals for Patient:   Short Term Goals: To be achieved in: 2 weeks  1. Independent in HEP and progression per patient tolerance, in order to prevent re-injury. [] Progressing: [x] Met: [] Not Met: [] Adjusted  2. Patient will have a decrease in pain to facilitate improvement in movement, function, and ADLs as indicated by Functional Deficits. [] Progressing: [] Met: [x] Not Met: [] Adjusted     Long Term Goals: To be achieved in: 8 weeks  1. Patient to improve TUG time 18 sec or less to demonstrate improved fall risk to assist with reaching prior level of function. [x] Progressing: [] Met: [] Not Met: [] Adjusted  2. Patient will improve 5x STS to 12 sec or less to demonstrate increased LE endurance for improved functional mobility. [x] Progressing: [] Met: [] Not Met: [] Adjusted  3.  Patient will return to functional activities including climbing 1 flight of stairs with reciprocal pattern and 1 HR to improve safety and independence

## 2023-07-17 NOTE — FLOWSHEET NOTE
975 [facility] Occupational Therapy  30 [facility] [address] [place] [address], [facility] [address]  Phone: [phone]   Fax: [phone]      Occupational Therapy Daily Treatment Note  Date:  2023    Patient: Macie Luna   : 1955   MRN: [id number]  Referring Physician: Dr. Murphy Parikh MD           Medical Diagnosis Information:  left hemiplegia    Date of Injury: 23  Date of Surgery:  n/a                                      Insurance information: [place] of care sent to provider:      []Faxed   [x]Co-signature    (attempts: 1[] 2[] 3[])       Progress Report: []  Yes  [x]  No     Date Range for reporting period:  Beginnin2023  Ending: end of POC    Progress report due (10 Rx/or 30 days whichever is less): visit #18 or 3/05/74    Recertification due (POC duration/ or 90 days whichever is less): visit #18 or 23     Visit # Insurance Allowable Auth required? Date Range   10/18 60 combined all therapy discliplines []  Yes  [x]  No N/a         Latex Allergy:  [x]No      []Yes  Pacemaker:  [x] No       [] Yes     Preferred Language for Healthcare:   [x]English       []other:    Pain level:  1/10, L anterior shoulder     SUBJECTIVE:  Pt reports mild pain in L shoulder.      Functional Disability Index:  7/10/22: Quick DASH: total score- 44, disability score- 75%     OBJECTIVE: See eval     AROM     L R   Shoulder Flexion  7/10/23: 0-72 with shoulder elevation and posterior lean  wnl   Elbow Flexion  7/10/23: 0-118      Supination   7/10/23: WFL     Wrist Flexion  7/10/23: 0-10 with hand offweighted     Wrist Extension   7/10/23: 0-21 with hand offweighted     CMC Abduction 7/10/23: Held in 25*    Composite Flexion (Tip of 3rd Digit to Distal Palmar Crease (cm))  7/10/23: 1 cm                          Hand Assessment Left  Right  Comments    9 Hole Peg Test (s) Unable  7/10/23: 3 min, 6 sec   7/10/23: L shoulder elevation and IR with R lateral lean; therapist holding

## 2023-07-19 ENCOUNTER — HOSPITAL ENCOUNTER (OUTPATIENT)
Dept: PHYSICAL THERAPY | Age: 68
Setting detail: THERAPIES SERIES
Discharge: HOME | End: 2023-07-19
Payer: MEDICARE

## 2023-07-19 ENCOUNTER — HOSPITAL ENCOUNTER (OUTPATIENT)
Dept: OCCUPATIONAL THERAPY | Age: 68
Setting detail: THERAPIES SERIES
Discharge: HOME | End: 2023-07-19
Payer: MEDICARE

## 2023-07-19 PROCEDURE — 97112 NEUROMUSCULAR REEDUCATION: CPT

## 2023-07-19 PROCEDURE — 97116 GAIT TRAINING THERAPY: CPT

## 2023-07-19 PROCEDURE — 97110 THERAPEUTIC EXERCISES: CPT

## 2023-07-19 NOTE — FLOWSHEET NOTE
hip, LE, and/or cervicothoracic/LS spine soft tissue/joints for the purpose of modulating pain, promoting relaxation,  increasing ROM, reducing/eliminating soft tissue swelling/inflammation/restriction, improving soft tissue extensibility and allowing for proper ROM for normal function with   [] LE / Core stability: self care, mobility, lifting and ambulation. [] UE / Shoulder complex: self care, reaching, carrying, lifting, house/yardwork, driving, computer work. Modalities:  [] (54912) Vasopneumatic compression: Utilized vasopneumatic compression to decrease edema / swelling for the purpose of improving mobility and quad tone / recruitment which will allow for increased overall function including but not limited to self-care, transfers, ambulation, and ascending / descending stairs. Charges:  Timed Code Treatment Minutes: 42   Total Treatment Minutes: 42     [] EVAL - LOW (60860)   [] EVAL - MOD (97696)  [] EVAL - HIGH (02842)  [] RE-EVAL (75054)  [] TE (25873) x     [] Ionto  [] NMR (95077) x      [] Vaso  [] Manual (40961) x      [] Ultrasound  [] TA x      [] Mech Traction (78306)  [x] Gait Training x 3   [] ES (un) (39904):   [] Aquatic therapy x   [] Other:   [] Group:     GOALS:   Patient stated goal: improve walking   [] Progressing: [] Met: [] Not Met: [] Adjusted     Therapist goals for Patient:   Short Term Goals: To be achieved in: 2 weeks  1. Independent in HEP and progression per patient tolerance, in order to prevent re-injury. [] Progressing: [x] Met: [] Not Met: [] Adjusted  2. Patient will have a decrease in pain to facilitate improvement in movement, function, and ADLs as indicated by Functional Deficits. [] Progressing: [] Met: [x] Not Met: [] Adjusted     Long Term Goals: To be achieved in: 8 weeks  1. Patient to improve TUG time 18 sec or less to demonstrate improved fall risk to assist with reaching prior level of function.    [x] Progressing: [] Met: [] Not Met: []

## 2023-07-19 NOTE — FLOWSHEET NOTE
975 [facility] Occupational Therapy  30 [facility] [address] [place] [address], [facility] [address]  Phone: [phone]   Fax: [phone]      Occupational Therapy Daily Treatment Note  Date:  2023    Patient: Milli Mcmahon   : 1955   MRN: [id number]  Referring Physician: Dr. Ap Urias MD           Medical Diagnosis Information:  left hemiplegia    Date of Injury: 23  Date of Surgery:  n/a                                      Insurance information: [place] of care sent to provider:      []Faxed   [x]Co-signature    (attempts: 1[] 2[] 3[])       Progress Report: []  Yes  [x]  No     Date Range for reporting period:  Beginnin2023  Ending: end of POC    Progress report due (10 Rx/or 30 days whichever is less): visit #18 or     Recertification due (POC duration/ or 90 days whichever is less): visit #18 or 23     Visit # Insurance Allowable Auth required? Date Range    60 combined all therapy discliplines []  Yes  [x]  No N/a         Latex Allergy:  [x]No      []Yes  Pacemaker:  [x] No       [] Yes     Preferred Language for Healthcare:   [x]English       []other:    Pain level:  1/10, L anterior shoulder     SUBJECTIVE:  patient reports she made soup for her  for lunch. Patient 15 minutes late for treatment today.      Functional Disability Index:  7/10/22: Quick DASH: total score- 44, disability score- 75%     OBJECTIVE: See eval     AROM     L R   Shoulder Flexion  7/10/23: 0-72 with shoulder elevation and posterior lean  wnl   Elbow Flexion  7/10/23: 0-118      Supination   7/10/23: WFL     Wrist Flexion  7/10/23: 0-10 with hand offweighted     Wrist Extension   7/10/23: 0-21 with hand offweighted     CMC Abduction 7/10/23: Held in 25*    Composite Flexion (Tip of 3rd Digit to Distal Palmar Crease (cm))  7/10/23: 1 cm                          Hand Assessment Left  Right  Comments    9 Hole Peg Test (s) Unable  7/10/23: 3 min, 6 sec   7/10/23:

## 2023-07-21 ENCOUNTER — HOSPITAL ENCOUNTER (OUTPATIENT)
Dept: OCCUPATIONAL THERAPY | Age: 68
Setting detail: THERAPIES SERIES
Discharge: HOME | End: 2023-07-21
Payer: MEDICARE

## 2023-07-21 ENCOUNTER — HOSPITAL ENCOUNTER (OUTPATIENT)
Dept: PHYSICAL THERAPY | Age: 68
Setting detail: THERAPIES SERIES
Discharge: HOME | End: 2023-07-21
Payer: MEDICARE

## 2023-07-21 PROCEDURE — 97110 THERAPEUTIC EXERCISES: CPT

## 2023-07-21 PROCEDURE — 97116 GAIT TRAINING THERAPY: CPT

## 2023-07-21 PROCEDURE — 97112 NEUROMUSCULAR REEDUCATION: CPT

## 2023-07-21 PROCEDURE — 97530 THERAPEUTIC ACTIVITIES: CPT

## 2023-07-21 NOTE — FLOWSHEET NOTE
975 [facility] Physical Therapy  Phone: [phone]   Fax: [phone]    Physical Therapy Daily Treatment Note    Date:  2023     Patient Name:  Rianna Leblanc    :  1955  MRN: [id number]  Medical Diagnosis:  Other cerebral infarction [I63.89]  Treatment Diagnosis: L LE weakness and decreased ROM s/p CVA, impaired gait and balance, difficulty with stairs, increased risk for falls, decreased durance, assistance required for gait and ADLs  Insurance/Certification information:  PT Insurance Information: [facility] Choice Plus: no copay, no auth, 60 visits combined HARD MAX; 85% coinsurance  Physician Information:  Anushka Arcos MD    Plan of care signed (Y/N): []  Yes [x]  No     Date of Patient follow up with Physician:      Progress Report: []  Yes  [x]  No     Date Range for reporting period:  Beginnin2023  PN: 23  Ending:       Progress report due (10 Rx/or 30 days whichever is less): visit #10 or     Recertification due (POC duration/ or 90 days whichever is less): visit #16 or 23     Visit # Insurance Allowable Auth required? Date Range    60 visits combined   HARD MAX []  Yes  [x]  No NA     Latex Allergy:  [x]NO      []YES  Preferred Language for Healthcare:   [x]English       []Other:    Functional Scale/Test Evaluation 2023 30 day   23 60 day  Discharge    Tinetti Assessment       TUG 37.7 sec 18.7 sec no AD     5X sts 15.8 sec 15.3 sec no UEs         Pain level:  0/10  Location:  n/a    SUBJECTIVE: Patient reports she has been working hard on her arm with OT. She still feels like her leg is stiff, but is not as stiff.       OBJECTIVE:   : See above       RESTRICTIONS/PRECAUTIONS: L ankle DF assist brace, L arm hypotonia     Interventions/Exercises:   Therapeutic Exercises (28071) Resistance / level Sets/sec Reps Notes   Scifit stepper with UEs , : PT assist with keeping L hand on handle   IB   HR

## 2023-07-21 NOTE — FLOWSHEET NOTE
975 [facility] Occupational Therapy  30 [facility] [address] [place] [address], [facility]  Phone: [phone]   Fax: [phone]      Occupational Therapy Daily Treatment Note  Date:  2023    Patient: Rianna Leblanc   : 1955   MRN: [id number]  Referring Physician: Dr. Anushka Arcos MD           Medical Diagnosis Information:  left hemiplegia    Date of Injury: 23  Date of Surgery:  n/a                                      Insurance information: [place] of care sent to provider:      []Faxed   [x]Co-signature    (attempts: 1[] 2[] 3[])       Progress Report: []  Yes  [x]  No     Date Range for reporting period:  Beginnin2023  Ending: end of POC    Progress report due (10 Rx/or 30 days whichever is less): visit #18 or     Recertification due (POC duration/ or 90 days whichever is less): visit #18 or 23     Visit # Insurance Allowable Auth required? Date Range    60 combined all therapy discliplines []  Yes  [x]  No N/a         Latex Allergy:  [x]No      []Yes  Pacemaker:  [x] No       [] Yes     Preferred Language for Healthcare:   [x]English       []other:    Pain level:  1/10, L anterior shoulder     SUBJECTIVE:  Pt reports thumb abduction kinesio tape helped.      Functional Disability Index:  7/10/22: Quick DASH: total score- 44, disability score- 75%     OBJECTIVE: See eval     AROM     L R   Shoulder Flexion  7/10/23: 0-72 with shoulder elevation and posterior lean  wnl   Elbow Flexion  7/10/23: 0-118      Supination   7/10/23: WFL     Wrist Flexion  7/10/23: 0-10 with hand offweighted     Wrist Extension   7/10/23: 0-21 with hand offweighted     CMC Abduction 7/10/23: Held in 25*    Composite Flexion (Tip of 3rd Digit to Distal Palmar Crease (cm))  7/10/23: 1 cm                          Hand Assessment Left  Right  Comments    9 Hole Peg Test (s) Unable  7/10/23: 3 min, 6 sec   7/10/23: L shoulder elevation and IR with R lateral lean;

## 2023-07-24 ENCOUNTER — HOSPITAL ENCOUNTER (OUTPATIENT)
Dept: PHYSICAL THERAPY | Age: 68
Setting detail: THERAPIES SERIES
Discharge: HOME | End: 2023-07-24
Payer: MEDICARE

## 2023-07-24 ENCOUNTER — HOSPITAL ENCOUNTER (OUTPATIENT)
Dept: OCCUPATIONAL THERAPY | Age: 68
Setting detail: THERAPIES SERIES
Discharge: HOME | End: 2023-07-24
Payer: MEDICARE

## 2023-07-24 PROCEDURE — 97110 THERAPEUTIC EXERCISES: CPT

## 2023-07-24 PROCEDURE — 97032 APPL MODALITY 1+ESTIM EA 15: CPT

## 2023-07-24 PROCEDURE — 97112 NEUROMUSCULAR REEDUCATION: CPT

## 2023-07-24 PROCEDURE — 97116 GAIT TRAINING THERAPY: CPT

## 2023-07-24 NOTE — FLOWSHEET NOTE
Limitations/Impairments:  []Sleeping []Sitting               [x]Standing [x]Transfers        [x]Walking [x]Kneeling               [x]Stairs [x]Squatting / bending   [x]ADLs [x]Reaching  [x]Lifting  [x]Housework  [x]Driving []Job related tasks  [x]Sports/Recreation []Other:        ASSESSMENT: Gait mechanics continue to improve. L recurvatum decreasing, pt gaining control. Nonuse of L hand and arm through session. Good form on steps this date. Will continue with LE strengthening, especially L hip flexors and hamstrings for improved gait. Treatment/Activity Tolerance:  [x] Patient able to complete tx [] Patient limited by fatigue  [] Patient limited by pain  [] Patient limited by other medical complications  [] Other:     Prognosis: [x] Good [] Fair  [] Poor    Patient Requires Follow-up: [x] Yes  [] No    Plan for next treatment session: initiate PT treatment focusing on balance and LE strength     PLAN: See eval. PT 2x / week for 8 weeks. [x] Continue per plan of care [] Alter current plan (see comments)  [] Plan of care initiated [] Hold pending MD visit [] Discharge    Electronically signed by: Dillon Maldonado PT, DPT    Note: If patient does not return for scheduled/ recommended follow up visits, his note will serve as a discharge from care along with most recent update on progress.

## 2023-07-24 NOTE — FLOWSHEET NOTE
975 [facility] Occupational Therapy  30 [facility] [address] [place] [address], [address]  Phone: [phone]   Fax: [phone]      Occupational Therapy Daily Treatment Note  Date:  2023    Patient: Phyllis Fischer   : 1955   MRN: [id number]  Referring Physician: Dr. Candida Rader MD           Medical Diagnosis Information:  left hemiplegia    Date of Injury: 23  Date of Surgery:  n/a                                      Insurance information: [place] of care sent to provider:      []Faxed   [x]Co-signature    (attempts: 1[] 2[] 3[])       Progress Report: []  Yes  [x]  No     Date Range for reporting period:  Beginnin2023  Ending: end of POC    Progress report due (10 Rx/or 30 days whichever is less): visit #18 or     Recertification due (POC duration/ or 90 days whichever is less): visit #18 or 23     Visit # Insurance Allowable Auth required? Date Range    60 combined all therapy discliplines []  Yes  [x]  No N/a         Latex Allergy:  [x]No      []Yes  Pacemaker:  [x] No       [] Yes     Preferred Language for Healthcare:   [x]English       []other:    Pain level:  1/10, L anterior shoulder     SUBJECTIVE:  Pt reports mild increased hand swelling on Saturday, but it resolved  after hand exercises.      Functional Disability Index:  7/10/22: Quick DASH: total score- 44, disability score- 75%     OBJECTIVE: See eval     AROM     L R   Shoulder Flexion  7/10/23: 0-72 with shoulder elevation and posterior lean  wnl   Elbow Flexion  7/10/23: 0-118      Supination   7/10/23: WFL     Wrist Flexion  7/10/23: 0-10 with hand offweighted     Wrist Extension   7/10/23: 0-21 with hand offweighted     CMC Abduction 7/10/23: Held in 25*    Composite Flexion (Tip of 3rd Digit to Distal Palmar Crease (cm))  7/10/23: 1 cm                          Hand Assessment Left  Right  Comments    9 Hole Peg Test (s) Unable  7/10/23: 3 min, 6 sec   7/10/23: L

## 2023-07-26 ENCOUNTER — HOSPITAL ENCOUNTER (OUTPATIENT)
Dept: OCCUPATIONAL THERAPY | Age: 68
Setting detail: THERAPIES SERIES
Discharge: HOME | End: 2023-07-26
Payer: MEDICARE

## 2023-07-26 ENCOUNTER — HOSPITAL ENCOUNTER (OUTPATIENT)
Dept: PHYSICAL THERAPY | Age: 68
Setting detail: THERAPIES SERIES
Discharge: HOME | End: 2023-07-26
Payer: MEDICARE

## 2023-07-26 PROCEDURE — 97110 THERAPEUTIC EXERCISES: CPT

## 2023-07-26 PROCEDURE — 97530 THERAPEUTIC ACTIVITIES: CPT

## 2023-07-26 PROCEDURE — 97112 NEUROMUSCULAR REEDUCATION: CPT

## 2023-07-26 NOTE — PLAN OF CARE
Progression has been slowed due to co-morbidities. [] Plan just implemented, too soon to assess goals progression <30days   [] Goals require adjustment due to lack of progress  [] Patient is not progressing as expected and requires additional follow up with physician  [] Other    Persisting Functional Limitations/Impairments:  []Sleeping []Sitting               [x]Standing [x]Transfers        [x]Walking [x]Kneeling               [x]Stairs [x]Squatting / bending   [x]ADLs [x]Reaching  [x]Lifting  [x]Housework  [x]Driving []Job related tasks  [x]Sports/Recreation []Other:        ASSESSMENT: POC completed - see above. Planning on decreasing frequency to 2x/wk to prolong therapy benefits. Treatment/Activity Tolerance:  [x] Patient able to complete tx [] Patient limited by fatigue  [] Patient limited by pain  [] Patient limited by other medical complications  [] Other:     Prognosis: [x] Good [] Fair  [] Poor    Patient Requires Follow-up: [x] Yes  [] No    Plan for next treatment session: initiate PT treatment focusing on balance and LE strength     PLAN: See frantz. PT 2x / week for 8 weeks. [x] Continue per plan of care [] Alter current plan (see comments)  [] Plan of care initiated [] Hold pending MD visit [] Discharge    Electronically signed by: Rober Santacruz, PT, DPT [facility]    Note: If patient does not return for scheduled/ recommended follow up visits, his note will serve as a discharge from care along with most recent update on progress.

## 2023-07-26 NOTE — FLOWSHEET NOTE
Manual Intervention                                                     Modalities:   Patient education:  Eval, POC, HEP- pt verbalized understanding          Home Exercise Program:  HEP instruction: Written and verbal HEP instructions provided and reviewed:  Patient initiated work on aarom in standing with weight shifts toward direction of functional reach . Initiated importance of hip stabilization with reach. Patient instructed to hold towel at either end and start working on facilitation of external shoulder rotation, abduction and grasp with use of counter force pulling towel away with right hand while holding with left with good performance. Patient then worked on placing cane on counter and holding at each end with both ues and completing side steps moving arm with body to move along the counter. Patient then worked on taking backward steps and then picking up cane from counter  6/30/23: pressing dowel/trekking stick into corner of wall or counter top while lifting into shoulder flexion    Therapeutic Exercise and NMR:  [x] (29270) Provided verbal/tactile cueing for activities related to strengthening, flexibility, endurance, ROM  for improvements in scapular, scapulothoracic and UE control with self care, reaching, carrying, lifting, house/yardwork, driving/computer work.    [] (83857) Provided verbal/tactile cueing for activities related to improving balance, coordination, kinesthetic sense, posture, motor skill, proprioception  to assist with  scapular, scapulothoracic and UE control with self care, reaching, carrying, lifting, house/yardwork, driving/computer work.   [] Comments:    Therapeutic Activities:    [x] (49691 or 40063) Provided verbal/tactile cueing for activities related to improving balance, coordination, kinesthetic sense, posture, motor skill, proprioception and motor activation to allow for proper function of scapular, scapulothoracic and

## 2023-07-28 ENCOUNTER — APPOINTMENT (OUTPATIENT)
Dept: OCCUPATIONAL THERAPY | Age: 68
End: 2023-07-28
Payer: MEDICARE

## 2023-07-28 ENCOUNTER — APPOINTMENT (OUTPATIENT)
Dept: PHYSICAL THERAPY | Age: 68
End: 2023-07-28
Payer: MEDICARE

## 2023-07-31 ENCOUNTER — HOSPITAL ENCOUNTER (OUTPATIENT)
Dept: OCCUPATIONAL THERAPY | Age: 68
Setting detail: THERAPIES SERIES
Discharge: HOME | End: 2023-07-31
Payer: MEDICARE

## 2023-07-31 ENCOUNTER — HOSPITAL ENCOUNTER (OUTPATIENT)
Dept: PHYSICAL THERAPY | Age: 68
Setting detail: THERAPIES SERIES
Discharge: HOME | End: 2023-07-31
Payer: MEDICARE

## 2023-07-31 PROCEDURE — 97530 THERAPEUTIC ACTIVITIES: CPT

## 2023-07-31 PROCEDURE — 97116 GAIT TRAINING THERAPY: CPT

## 2023-07-31 PROCEDURE — 97112 NEUROMUSCULAR REEDUCATION: CPT

## 2023-07-31 NOTE — FLOWSHEET NOTE
975 [facility] Occupational Therapy  30 [facility] [address] [place] [address], [facility] [address]  Phone: [phone]   Fax: [phone]      Occupational Therapy Daily Treatment Note  Date:  2023    Patient: Saskia Rodríguez   : 1955   MRN: [id number]  Referring Physician: Dr. Matt Paz MD           Medical Diagnosis Information:  left hemiplegia    Date of Injury: 23  Date of Surgery:  n/a                                      Insurance information: [place] of care sent to provider:      []Faxed   [x]Co-signature    (attempts: 1[] 2[] 3[])       Progress Report: []  Yes  [x]  No     Date Range for reporting period:  Beginnin2023  Ending: end of POC    Progress report due (10 Rx/or 30 days whichever is less): visit #18 or     Recertification due (POC duration/ or 90 days whichever is less): visit #18 or 23     Visit # Insurance Allowable Auth required? Date Range   15/18 60 combined all therapy discliplines []  Yes  [x]  No N/a         Latex Allergy:  [x]No      []Yes  Pacemaker:  [x] No       [] Yes     Preferred Language for Healthcare:   [x]English       []other:    Pain level:  1/10, L anterior shoulder     SUBJECTIVE:  Pt reports kinesio tape for thumb extension and abduction helped and she would like to try it again.      Functional Disability Index:  7/10/22: Quick DASH: total score- 44, disability score- 75%     OBJECTIVE: See eval     AROM     L R   Shoulder Flexion  7/10/23: 0-72 with shoulder elevation and posterior lean  wnl   Elbow Flexion  7/10/23: 0-118      Supination   7/10/23: WFL     Wrist Flexion  7/10/23: 0-10 with hand offweighted     Wrist Extension   7/10/23: 0-21 with hand offweighted     CMC Abduction 7/10/23: Held in 25*    Composite Flexion (Tip of 3rd Digit to Distal Palmar Crease (cm))  7/10/23: 1 cm                          Hand Assessment Left  Right  Comments    9 Hole Peg Test (s) Unable  7/10/23: 3 min, 6 sec   7/10/23: L

## 2023-07-31 NOTE — FLOWSHEET NOTE
Static lunge with big arm swing      Static lunge with ball trunk twist  R LE fwd - held arms staticly at 90 deg flexion    Knee flexion and extension with fwd foot on 8in box  Tactile cues needed at hips to prevent forward movement of hips instead of isolated knee flexion/extension    Step over 1/2 foam roll: Forward  Retro    Standing marches single leg     Gait training with VCs for exaggerated step over on L (increased knee flexion, land heel and roll to toe)         7/17: CGA              VCs for arm swings   Hip flexion from step  L step with lift and ext to bring down   7/17 B UE support   7/21: R UE support   Backwards walking   7/24 : VCs for larger L step   CC weighted walkout 2.5 pl  2.0 pl X 4 fwd  X 4 retro   7/31                        Manual Intervention (39717)                                                     Modalities:     Pt. Education:  6/12/2023 patient educated on diagnosis, prognosis and expectations for rehab, all patient questions were answered    HEP instruction:    Access Code: [email or web address]  URL: [email or web address]  Date: 06/27/2023  Prepared by: Evan Garcia    Exercises  - Supine Heel Slide  - 1 x daily - 7 x weekly - 3 sets - 10 reps  - Figure 4 Bridge  - 1 x daily - 7 x weekly - 3 sets - 10 reps  - Seated Knee Flexion  - 1 x daily - 7 x weekly - 3 sets - 10 reps    Access Code: [email or web address]  URL: [email or web address]  Date: 06/12/2023  Prepared by: [facility]  - 2 x daily - 7 x weekly - 2 sets - 10 reps  - Seated March  - 2 x daily - 7 x weekly - 2 sets - 10 reps  - Seated Knee Flexion  - 2 x daily - 7 x weekly - 2 sets - 10 reps      Therapeutic Exercise and NMR EXR  [x] (15552) Provided verbal/tactile cueing for activities related to strengthening, flexibility, endurance, ROM for improvements in  [x] LE / Core stability: LE, hip, and core control with self care, mobility, lifting, ambulation.   [] UE / Shoulder complex:

## 2023-08-01 ENCOUNTER — APPOINTMENT (OUTPATIENT)
Dept: OCCUPATIONAL THERAPY | Age: 68
End: 2023-08-01
Payer: MEDICARE

## 2023-08-01 ENCOUNTER — HOSPITAL ENCOUNTER (OUTPATIENT)
Dept: PHYSICAL THERAPY | Age: 68
Setting detail: THERAPIES SERIES
End: 2023-08-01
Payer: MEDICARE

## 2023-08-03 ENCOUNTER — HOSPITAL ENCOUNTER (OUTPATIENT)
Dept: PHYSICAL THERAPY | Age: 68
Setting detail: THERAPIES SERIES
Discharge: HOME | End: 2023-08-03
Payer: MEDICARE

## 2023-08-03 ENCOUNTER — HOSPITAL ENCOUNTER (OUTPATIENT)
Dept: OCCUPATIONAL THERAPY | Age: 68
Setting detail: THERAPIES SERIES
Discharge: HOME | End: 2023-08-03
Payer: MEDICARE

## 2023-08-03 PROCEDURE — 97110 THERAPEUTIC EXERCISES: CPT

## 2023-08-03 PROCEDURE — 97112 NEUROMUSCULAR REEDUCATION: CPT

## 2023-08-03 PROCEDURE — 97116 GAIT TRAINING THERAPY: CPT

## 2023-08-03 PROCEDURE — 97530 THERAPEUTIC ACTIVITIES: CPT

## 2023-08-03 NOTE — FLOWSHEET NOTE
975 [facility] Occupational Therapy  30 [facility] [address] [place] [address], [facility] [address]  Phone: [phone]   Fax: [phone]      Occupational Therapy Daily Treatment Note  Date:  8/3/2023    Patient: Amber Campbell   : 1955   MRN: [id number]  Referring Physician: Dr. Kieran Lambert MD           Medical Diagnosis Information:  left hemiplegia    Date of Injury: 23  Date of Surgery:  n/a                                      Insurance information: [place] of care sent to provider:      []Faxed   [x]Co-signature    (attempts: 1[] 2[] 3[])       Progress Report: []  Yes  [x]  No     Date Range for reporting period:  Beginnin2023  Ending: end of POC    Progress report due (10 Rx/or 30 days whichever is less): visit #18 or 3/97/49    Recertification due (POC duration/ or 90 days whichever is less): visit #18 or 23     Visit # Insurance Allowable Auth required? Date Range    60 combined all therapy discliplines []  Yes  [x]  No N/a         Latex Allergy:  [x]No      []Yes  Pacemaker:  [x] No       [] Yes     Preferred Language for Healthcare:   [x]English       []other:    Pain level:  1/10, L anterior shoulder     SUBJECTIVE:  Pt reports pain in R foot with apparent bony growth. Will talk to PT.      Functional Disability Index:  7/10/22: Quick DASH: total score- 44, disability score- 75%     OBJECTIVE: See eval     AROM     L R   Shoulder Flexion  7/10/23: 0-72 with shoulder elevation and posterior lean  wnl   Elbow Flexion  7/10/23: 0-118      Supination   7/10/23: WFL     Wrist Flexion  7/10/23: 0-10 with hand offweighted     Wrist Extension   7/10/23: 0-21 with hand offweighted     CMC Abduction 7/10/23: Held in 25*    Composite Flexion (Tip of 3rd Digit to Distal Palmar Crease (cm))  7/10/23: 1 cm                          Hand Assessment Left  Right  Comments    9 Hole Peg Test (s) Unable  7/10/23: 3 min, 6 sec   7/10/23: L shoulder elevation and IR with

## 2023-08-03 NOTE — FLOWSHEET NOTE
975 [facility] Physical Therapy  Phone: [phone]   Fax: [phone]    Physical Therapy Daily Treatment Note    Date:  2023     Patient Name:  Aishwarya Pizano    :  1955  MRN: [id number]  Medical Diagnosis:  Other cerebral infarction [I63.89]  Treatment Diagnosis: L LE weakness and decreased ROM s/p CVA, impaired gait and balance, difficulty with stairs, increased risk for falls, decreased durance, assistance required for gait and ADLs  Insurance/Certification information:  PT Insurance Information: [facility] Choice Plus: no copay, no auth, 60 visits combined HARD MAX; 85% coinsurance  Physician Information:  Ghada Valladares MD    Plan of care signed (Y/N): []  Yes [x]  No     Date of Patient follow up with Physician:      Progress Report: []  Yes  [x]  No     Date Range for reporting period:  Beginnin2023  PN: 23  POC: 23  Ending:     Progress report due (10 Rx/or 30 days whichever is less): visit #26 or 3/45/90    Recertification due (POC duration/ or 90 days whichever is less): visit #26 or 23     Visit # Insurance Allowable Auth required? Date Range    +  60 visits combined   HARD MAX []  Yes  [x]  No NA     Latex Allergy:  [x]NO      []YES  Preferred Language for Healthcare:   [x]English       []Other:    Functional Scale/Test Evaluation 2023 30 day   23 60 day   23 Discharge    Tinetti Assessment       TUG 37.7 sec 18.7 sec no AD 17.89 no AD  16.1 sec w/ walking stick    5X sts 15.8 sec 15.3 sec no UEs 12.7 sec no UEs    6 min Walk test   542 ft w/ walking stick        Pain level:  0/10  Location:  n/a    SUBJECTIVE: Pt reports her R foot is hurting. Requested to not do a lot of walking.       OBJECTIVE:   8/3: advised pt to see podiatrist     : See above       RESTRICTIONS/PRECAUTIONS: L ankle DF assist brace, L arm hypotonia     Interventions/Exercises:   Therapeutic Exercises (29292) Resistance /

## 2023-08-08 ENCOUNTER — HOSPITAL ENCOUNTER (OUTPATIENT)
Dept: PHYSICAL THERAPY | Age: 68
Setting detail: THERAPIES SERIES
Discharge: HOME | End: 2023-08-08
Payer: MEDICARE

## 2023-08-08 ENCOUNTER — HOSPITAL ENCOUNTER (OUTPATIENT)
Dept: OCCUPATIONAL THERAPY | Age: 68
Setting detail: THERAPIES SERIES
Discharge: HOME | End: 2023-08-08
Payer: MEDICARE

## 2023-08-08 PROCEDURE — 97110 THERAPEUTIC EXERCISES: CPT

## 2023-08-08 PROCEDURE — 97530 THERAPEUTIC ACTIVITIES: CPT

## 2023-08-08 PROCEDURE — 97112 NEUROMUSCULAR REEDUCATION: CPT

## 2023-08-08 NOTE — FLOWSHEET NOTE
complex: cervical, scapular, scapulothoracic and UE control with self care, reaching, carrying, lifting, house/yardwork, driving, computer work. NMR and Therapeutic Activities:    [x] (88782 or 92020) Provided verbal/tactile cueing for activities related to improving balance, coordination, kinesthetic sense, posture, motor skill, proprioception and motor activation to allow for proper function of   [x] LE / Core, hip and LE with self care and ADLs  [] UE / Shoulder complex: cervical, postural, scapular, scapulothoracic and UE control with self care, carrying, lifting, driving, computer work. [x] (30446) Gait Re-education- Provided training and instruction to the patient for proper LE, core and hip recruitment, positioning, and eccentric body weight control with ambulation re-education, including ascending & descending stairs     Home Management Training / Self Care:  [] (18255) Provided self-care/home management training related to activities of daily living and compensatory training, and/or use of adaptive equipment for improvement with: ADLs and compensatory training, meal preparation, safety procedures and instruction in use of adaptive equipment, including bathing, grooming, dressing, personal hygiene, basic household cleaning and chores.        Home Exercise Program:    [] (99334) Reviewed/Progressed HEP activities related to strengthening, flexibility, endurance, ROM of   [] LE / Core stability: core, hip and LE for functional self-care, mobility, lifting and ambulation/stair navigation   [] UE / Shoulder complex: cervical, postural, scapular, scapulothoracic and UE control with self care, reaching, carrying, lifting, house/yardwork, driving, computer work  [] (87036)Reviewed/Progressed HEP activities related to improving balance, coordination, kinesthetic sense, posture, motor skill, proprioception of   [] LE: core, hip and LE for self care, mobility, lifting, and ambulation/stair navigation    [] UE /

## 2023-08-10 ENCOUNTER — APPOINTMENT (OUTPATIENT)
Dept: PHYSICAL THERAPY | Age: 68
End: 2023-08-10
Payer: MEDICARE

## 2023-08-10 ENCOUNTER — APPOINTMENT (OUTPATIENT)
Dept: OCCUPATIONAL THERAPY | Age: 68
End: 2023-08-10
Payer: MEDICARE

## 2023-08-11 ENCOUNTER — APPOINTMENT (OUTPATIENT)
Dept: OCCUPATIONAL THERAPY | Age: 68
End: 2023-08-11
Payer: MEDICARE

## 2023-08-11 ENCOUNTER — APPOINTMENT (OUTPATIENT)
Dept: PHYSICAL THERAPY | Age: 68
End: 2023-08-11
Payer: MEDICARE

## 2023-08-14 ENCOUNTER — HOSPITAL ENCOUNTER (OUTPATIENT)
Dept: OCCUPATIONAL THERAPY | Age: 68
Setting detail: THERAPIES SERIES
Discharge: HOME | End: 2023-08-14
Payer: MEDICARE

## 2023-08-14 ENCOUNTER — HOSPITAL ENCOUNTER (OUTPATIENT)
Dept: PHYSICAL THERAPY | Age: 68
Setting detail: THERAPIES SERIES
Discharge: HOME | End: 2023-08-14
Payer: MEDICARE

## 2023-08-14 PROCEDURE — 97110 THERAPEUTIC EXERCISES: CPT

## 2023-08-14 PROCEDURE — 97112 NEUROMUSCULAR REEDUCATION: CPT

## 2023-08-14 PROCEDURE — 97116 GAIT TRAINING THERAPY: CPT

## 2023-08-14 PROCEDURE — 97168 OT RE-EVAL EST PLAN CARE: CPT

## 2023-08-14 PROCEDURE — 97140 MANUAL THERAPY 1/> REGIONS: CPT

## 2023-08-14 NOTE — FLOWSHEET NOTE
975 [facility] Physical Therapy  Phone: [phone]   Fax: [phone]    Physical Therapy Daily Treatment Note    Date:  2023     Patient Name:  Scott Betancourt    :  1955  MRN: [id number]  Medical Diagnosis:  Other cerebral infarction [I63.89]  Treatment Diagnosis: L LE weakness and decreased ROM s/p CVA, impaired gait and balance, difficulty with stairs, increased risk for falls, decreased durance, assistance required for gait and ADLs  Insurance/Certification information:  PT Insurance Information: [facility] Choice Plus: no copay, no auth, 60 visits combined HARD MAX; 85% coinsurance  Physician Information:  Mehul Chakraborty MD    Plan of care signed (Y/N): []  Yes [x]  No     Date of Patient follow up with Physician:      Progress Report: []  Yes  [x]  No     Date Range for reporting period:  Beginnin2023  PN: 23  POC: 23  Ending:     Progress report due (10 Rx/or 30 days whichever is less): visit #26 or     Recertification due (POC duration/ or 90 days whichever is less): visit #26 or 23     Visit # Insurance Allowable Auth required? Date Range    +  60 visits combined   HARD MAX []  Yes  [x]  No NA     Latex Allergy:  [x]NO      []YES  Preferred Language for Healthcare:   [x]English       []Other:    Functional Scale/Test Evaluation 2023 30 day   23 60 day   23 Discharge    Tinetti Assessment       TUG 37.7 sec 18.7 sec no AD 17.89 no AD  16.1 sec w/ walking stick    5X sts 15.8 sec 15.3 sec no UEs 12.7 sec no UEs    6 min Walk test   542 ft w/ walking stick        Pain level:  0/10  Location:  n/a    SUBJECTIVE: Pt reports she talked to the MD about her R foot pain. Told her to purchase an orthotic - has increased support medially. Foot is still painful, but feels better while wearing it.  L shoulder is painful as well, but is working on it with OT.      OBJECTIVE:   8/3: advised pt to see

## 2023-08-15 ENCOUNTER — APPOINTMENT (OUTPATIENT)
Dept: OCCUPATIONAL THERAPY | Age: 68
End: 2023-08-15
Payer: MEDICARE

## 2023-08-15 ENCOUNTER — APPOINTMENT (OUTPATIENT)
Dept: PHYSICAL THERAPY | Age: 68
End: 2023-08-15
Payer: MEDICARE

## 2023-08-17 ENCOUNTER — HOSPITAL ENCOUNTER (OUTPATIENT)
Dept: PHYSICAL THERAPY | Age: 68
Setting detail: THERAPIES SERIES
Discharge: HOME | End: 2023-08-17
Payer: MEDICARE

## 2023-08-17 ENCOUNTER — HOSPITAL ENCOUNTER (OUTPATIENT)
Dept: OCCUPATIONAL THERAPY | Age: 68
Setting detail: THERAPIES SERIES
Discharge: HOME | End: 2023-08-17
Payer: MEDICARE

## 2023-08-17 PROCEDURE — 97112 NEUROMUSCULAR REEDUCATION: CPT

## 2023-08-17 PROCEDURE — 97116 GAIT TRAINING THERAPY: CPT

## 2023-08-17 PROCEDURE — 97110 THERAPEUTIC EXERCISES: CPT

## 2023-08-17 PROCEDURE — 97530 THERAPEUTIC ACTIVITIES: CPT

## 2023-08-17 NOTE — FLOWSHEET NOTE
Towards Functional goals/ Treatment Progress Update:  [x] Patient is progressing as expected towards functional goals listed. [] Progression is slowed due to complexities/Impairments listed. [] Progression has been slowed due to co-morbidities. [] Plan just implemented, too soon to assess goals progression <30days   [] Goals require adjustment due to lack of progress  [] Patient is not progressing as expected and requires additional follow up with physician  [] Other    Persisting Functional Limitations/Impairments:  []Sleeping []Sitting               [x]Standing [x]Transfers        [x]Walking [x]Kneeling               [x]Stairs [x]Squatting / bending   [x]ADLs [x]Reaching  [x]Lifting  [x]Housework  [x]Driving []Job related tasks  [x]Sports/Recreation []Other:        ASSESSMENT: Pt continues to have difficulty with weight shifting to L heel. Poor control of L UE - unable to maintain pressure into ball with TG. Amb well without AD - did cue for increased arm swing and incr L knee flexion with step through. Overall, pt gross motor improved, however needs more focus on L UE. Plan to see pt 2 more visits to conserve visits due to insurance. Treatment/Activity Tolerance:  [x] Patient able to complete tx [] Patient limited by fatigue  [] Patient limited by pain  [] Patient limited by other medical complications  [] Other:     Prognosis: [x] Good [] Fair  [] Poor    Patient Requires Follow-up: [x] Yes  [] No    Plan for next treatment session: initiate PT treatment focusing on balance and LE strength     PLAN: See eval. PT 2x / week for 8 weeks. [x] Continue per plan of care [] Alter current plan (see comments)  [] Plan of care initiated [] Hold pending MD visit [] Discharge    Electronically signed by: Paresh Meier PT, DPT    Note: If patient does not return for scheduled/ recommended follow up visits, his note will serve as a discharge from care along with most recent update on progress.

## 2023-08-17 NOTE — FLOWSHEET NOTE
975 [facility] Occupational Therapy  30 [facility] [address] [place] [address], [facility]  Phone: [phone]   Fax: [phone]      Occupational Therapy Daily Treatment Note  Date:  2023    Patient: Saskia Rodríguez   : 1955   MRN: [id number]  Referring Physician: Dr. Matt Paz MD           Medical Diagnosis Information:  left hemiplegia    Date of Injury: 23  Date of Surgery:  n/a                                      Insurance information: [place] of care sent to provider:      []Faxed   [x]Co-signature    (attempts: 1[] 2[] 3[])       Progress Report: []  Yes  [x]  No     Date Range for reporting period:  Beginnin2023  Ending: end of POC    Progress report due (10 Rx/or 30 days whichever is less): visit #18 or 59    Recertification due (POC duration/ or 90 days whichever is less): visit #18 or 23    Visit # Insurance Allowable Auth required? Date Range    +  60 combined all therapy discliplines []  Yes  [x]  No N/a         Latex Allergy:  [x]No      []Yes  Pacemaker:  [x] No       [] Yes     Preferred Language for Healthcare:   [x]English       []other:    Pain level:  1-2/10, L anterior shoulder     SUBJECTIVE:  Pt reports decreased shoulder pain with addition to HEP and new medication with improved sleep.      Functional Disability Index:  7/10/22: Quick DASH: total score- 44, disability score- 75%     OBJECTIVE: See eval     AROM     L R   Shoulder Flexion  7/10/23: 0-72 with shoulder elevation and posterior lean   limited to 40 degrees due to pain  wnl   Elbow Flexion  7/10/23: 0-118     0-100 8/   Supination   7/10/23: WFL     Wrist Flexion  7/10/23: 0-10 with hand offweighted     Wrist Extension   7/10/23: 0-21 with hand offweighted       0-30     CMC Abduction 7/10/23: Held in 25*    Composite Flexion (Tip of 3rd Digit to Distal Palmar Crease (cm))  7/10/23: 1 cm                          Hand Assessment Left

## 2023-08-21 ENCOUNTER — APPOINTMENT (OUTPATIENT)
Dept: PHYSICAL THERAPY | Age: 68
End: 2023-08-21
Payer: MEDICARE

## 2023-08-21 ENCOUNTER — HOSPITAL ENCOUNTER (OUTPATIENT)
Dept: OCCUPATIONAL THERAPY | Age: 68
Setting detail: THERAPIES SERIES
Discharge: HOME | End: 2023-08-21
Payer: MEDICARE

## 2023-08-21 PROCEDURE — 97530 THERAPEUTIC ACTIVITIES: CPT

## 2023-08-21 PROCEDURE — 97112 NEUROMUSCULAR REEDUCATION: CPT

## 2023-08-21 NOTE — FLOWSHEET NOTE
975 [facility] Occupational Therapy  30 [facility] [address] [place] [address], [facility]  Phone: [phone]   Fax: [phone]      Occupational Therapy Daily Treatment Note  Date:  2023    Patient: Jomar Lyon   : 1955   MRN: [id number]  Referring Physician: Dr. Timmy Gomez MD           Medical Diagnosis Information:  left hemiplegia    Date of Injury: 23  Date of Surgery:  n/a                                      Insurance information: [place] of care sent to provider:      []Faxed   [x]Co-signature    (attempts: 1[] 2[] 3[])       Progress Report: []  Yes  [x]  No     Date Range for reporting period:  Beginnin2023  Ending: end of POC    Progress report due (10 Rx/or 30 days whichever is less): visit #18 or     Recertification due (POC duration/ or 90 days whichever is less): visit #18 or 23    Visit # Insurance Allowable Auth required? Date Range    +  60 combined all therapy discliplines []  Yes  [x]  No N/a         Latex Allergy:  [x]No      []Yes  Pacemaker:  [x] No       [] Yes     Preferred Language for Healthcare:   [x]English       []other:    Pain level:  1-2/10, L anterior shoulder     SUBJECTIVE:  Pt reports some increased swelling in L hand and LLE, possibly due to medication change. Pt reports completing some coloring at home with L hand.      Functional Disability Index:  7/10/22: Quick DASH: total score- 44, disability score- 75%     OBJECTIVE: See eval     AROM     L R   Shoulder Flexion  7/10/23: 0-72 with shoulder elevation and posterior lean   limited to 40 degrees due to pain  wnl   Elbow Flexion  7/10/23: 0-118     0-100 8/   Supination   7/10/23: WFL     Wrist Flexion  7/10/23: 0-10 with hand offweighted     Wrist Extension   7/10/23: 0-21 with hand offweighted       0-30     CMC Abduction 7/10/23: Held in 25*    Composite Flexion (Tip of 3rd Digit to Distal Palmar Crease (cm))  7/10/23:

## 2023-08-24 ENCOUNTER — APPOINTMENT (OUTPATIENT)
Dept: OCCUPATIONAL THERAPY | Age: 68
End: 2023-08-24
Payer: MEDICARE

## 2023-08-24 ENCOUNTER — APPOINTMENT (OUTPATIENT)
Dept: PHYSICAL THERAPY | Age: 68
End: 2023-08-24
Payer: MEDICARE

## 2023-08-25 ENCOUNTER — APPOINTMENT (OUTPATIENT)
Dept: PHYSICAL THERAPY | Age: 68
End: 2023-08-25
Payer: MEDICARE

## 2023-08-25 ENCOUNTER — APPOINTMENT (OUTPATIENT)
Dept: OCCUPATIONAL THERAPY | Age: 68
End: 2023-08-25
Payer: MEDICARE

## 2023-08-28 ENCOUNTER — APPOINTMENT (OUTPATIENT)
Dept: PHYSICAL THERAPY | Age: 68
End: 2023-08-28
Payer: MEDICARE

## 2023-08-28 ENCOUNTER — HOSPITAL ENCOUNTER (OUTPATIENT)
Dept: OCCUPATIONAL THERAPY | Age: 68
Setting detail: THERAPIES SERIES
Discharge: HOME | End: 2023-08-28
Payer: MEDICARE

## 2023-08-28 PROCEDURE — 97112 NEUROMUSCULAR REEDUCATION: CPT

## 2023-08-28 PROCEDURE — 97530 THERAPEUTIC ACTIVITIES: CPT

## 2023-08-28 NOTE — FLOWSHEET NOTE
975 [facility] Occupational Therapy  30 [facility] [address] [place] [address], [facility]  Phone: [phone]   Fax: [phone]      Occupational Therapy Daily Treatment Note  Date:  2023    Patient: Roney Hairsotn   : 1955   MRN: [id number]  Referring Physician: Dr. Radha Baker MD           Medical Diagnosis Information:  left hemiplegia    Date of Injury: 23  Date of Surgery:  n/a                                      Insurance information: [place] of care sent to provider:      []Faxed   [x]Co-signature    (attempts: 1[] 2[] 3[])       Progress Report: []  Yes  [x]  No     Date Range for reporting period:  Beginnin2023  Ending: end of POC    Progress report due (10 Rx/or 30 days whichever is less): visit #18 or 36    Recertification due (POC duration/ or 90 days whichever is less): visit #18 or 23    Visit # Insurance Allowable Auth required?  Date Range    + 3/12 60 combined all therapy discliplines []  Yes  [x]  No N/a         Latex Allergy:  [x]No      []Yes  Pacemaker:  [x] No       [] Yes     Preferred Language for Healthcare:   [x]English       []other:    Pain level:  1-2/10, L anterior shoulder     SUBJECTIVE:  Pt reports improved L hand swelling and use since medication change from [place].     Functional Disability Index:  7/10/22: Quick DASH: total score- 44, disability score- 75%     OBJECTIVE: See eval     AROM     L R   Shoulder Flexion  7/10/23: 0-72 with shoulder elevation and posterior lean   limited to 40 degrees due to pain  wnl   Elbow Flexion  7/10/23: 0-118     0-100 8/   Supination   7/10/23: WFL     Wrist Flexion  7/10/23: 0-10 with hand offweighted     Wrist Extension   7/10/23: 0-21 with hand offweighted       0-30     CMC Abduction 7/10/23: Held in 25*    Composite Flexion (Tip of 3rd Digit to Distal Palmar Crease (cm))  7/10/23: 1 cm                          Hand Assessment Left  Right  Comments

## 2023-08-31 ENCOUNTER — HOSPITAL ENCOUNTER (OUTPATIENT)
Dept: PHYSICAL THERAPY | Age: 68
Setting detail: THERAPIES SERIES
Discharge: HOME | End: 2023-08-31
Payer: MEDICARE

## 2023-08-31 ENCOUNTER — HOSPITAL ENCOUNTER (OUTPATIENT)
Dept: OCCUPATIONAL THERAPY | Age: 68
Setting detail: THERAPIES SERIES
Discharge: HOME | End: 2023-08-31
Payer: MEDICARE

## 2023-08-31 PROCEDURE — 97112 NEUROMUSCULAR REEDUCATION: CPT

## 2023-08-31 PROCEDURE — 97116 GAIT TRAINING THERAPY: CPT

## 2023-08-31 PROCEDURE — 97110 THERAPEUTIC EXERCISES: CPT

## 2023-08-31 PROCEDURE — 97530 THERAPEUTIC ACTIVITIES: CPT

## 2023-08-31 NOTE — FLOWSHEET NOTE
975 [facility] Occupational Therapy  30 [facility] [address] [place] [address], [facility]  Phone: [phone]   Fax: [phone]      Occupational Therapy Daily Treatment Note  Date:  2023    Patient: Adriano Meneses   : 1955   MRN: [id number]  Referring Physician: Dr. Dylan Ulloa MD           Medical Diagnosis Information:  left hemiplegia    Date of Injury: 23  Date of Surgery:  n/a                                      Insurance information: [place] of care sent to provider:      []Faxed   [x]Co-signature    (attempts: 1[] 2[] 3[])       Progress Report: []  Yes  [x]  No     Date Range for reporting period:  Beginnin2023  Ending: end of POC    Progress report due (10 Rx/or 30 days whichever is less): visit #18 or     Recertification due (POC duration/ or 90 days whichever is less): visit #18 or 23    Visit # Insurance Allowable Auth required? Date Range    +  60 combined all therapy discliplines []  Yes  [x]  No N/a         Latex Allergy:  [x]No      []Yes  Pacemaker:  [x] No       [] Yes     Preferred Language for Healthcare:   [x]English       []other:    Pain level:  1-2/10, L anterior shoulder     SUBJECTIVE:  Pt reports stretching L hand every morning due to stiffness.      Functional Disability Index:  7/10/22: Quick DASH: total score- 44, disability score- 75%     OBJECTIVE: See eval     AROM     L R   Shoulder Flexion  7/10/23: 0-72 with shoulder elevation and posterior lean   limited to 40 degrees due to pain  wnl   Elbow Flexion  7/10/23: 0-118   2023  0-100    Supination   7/10/23: WFL     Wrist Flexion  7/10/23: 0-10 with hand offweighted     Wrist Extension   7/10/23: 0-21 with hand offweighted       0-30     CMC Abduction 7/10/23: Held in 25*    Composite Flexion (Tip of 3rd Digit to Distal Palmar Crease (cm))  7/10/23: 1 cm                          Hand Assessment Left  Right  Comments    9 Hole Peg Test

## 2023-09-07 ENCOUNTER — HOSPITAL ENCOUNTER (OUTPATIENT)
Dept: OCCUPATIONAL THERAPY | Age: 68
Setting detail: THERAPIES SERIES
Discharge: HOME | End: 2023-09-07
Payer: MEDICARE

## 2023-09-07 PROCEDURE — 97530 THERAPEUTIC ACTIVITIES: CPT

## 2023-09-07 PROCEDURE — 97112 NEUROMUSCULAR REEDUCATION: CPT

## 2023-09-07 PROCEDURE — 97140 MANUAL THERAPY 1/> REGIONS: CPT

## 2023-09-07 NOTE — FLOWSHEET NOTE
(unattended) [phone])  [] Fluidotherapy (58579)  [] Other:    GOALS:    Patient stated goal: improve function in left ue and drive again  [x] Progressing: [] Met: [] Not Met: [] Adjusted     Therapist goals for Patient:   Short Term Goals: To be achieved in: 30 days  1. Pt will use left ue as functional stabilizer during games with family   [x] Progressing: [] Met: [] Not Met: [] Adjusted  2. Pt will demonstrate use of left ue as bimanual assist carrying objects during ADL and IADL  [x] Progressing: [] Met: [] Not Met: [] Adjusted     Long Term Goals: To be achieved by discharge   1. Pt will report a QuickDASH Symptom Severity Scale score of  50% or less indicating increased safety and functional independence in desired occupational pursuits by discharge. [x] Progressing: [] Met: [] Not Met: [] Adjusted       Progression Towards Functional goals:  [] Patient is progressing as expected towards functional goals listed. [x] Progression is slowed due to complexities listed. [] Progression has been slowed due to co-morbidities. [] Plan just implemented, too soon to assess goals progression  [] All goals are met  [] Other:     ASSESSMENT:   Patient is demonstrating increased shoulder pain due to compensatory strategies that are probably increasing as patient fatigues with increased attempts at all self care and IADL. Pendulum and shoulder shrug exercises pt added to HEP without therapist recommendation also likely contributing. Needs repetition and practice with functional reach, as well as shoulder stability training to increase proprioception and coordination and decrease pain. Continued functional thumb ab/adduction on this date, but with rapid fatigue.      Treatment/Activity Tolerance:  [x] Patient tolerated treatment well [] Patient limited by fatigue  [] Patient limited by pain  [] Patient limited by other medical complications  [] Other:     Prognosis: [x] Good [] Fair  [] Poor    Patient Requires

## 2023-09-11 ENCOUNTER — HOSPITAL ENCOUNTER (OUTPATIENT)
Dept: OCCUPATIONAL THERAPY | Age: 68
Setting detail: THERAPIES SERIES
Discharge: HOME | End: 2023-09-11
Payer: MEDICARE

## 2023-09-11 PROCEDURE — 97112 NEUROMUSCULAR REEDUCATION: CPT

## 2023-09-11 PROCEDURE — 97110 THERAPEUTIC EXERCISES: CPT

## 2023-09-11 NOTE — FLOWSHEET NOTE
975 [facility] Occupational Therapy  30 [facility] [address] [place] [address], [facility]  Phone: [phone]   Fax: [phone]      Occupational Therapy Daily Treatment Note  Date:  2023    Patient: Jose Tracy   : 1955   MRN: [id number]  Referring Physician: Dr. German Gallo MD           Medical Diagnosis Information:  left hemiplegia    Date of Injury: 23  Date of Surgery:  n/a                                      Insurance information: [place] of care sent to provider:      []Faxed   [x]Co-signature    (attempts: 1[] 2[] 3[])       Progress Report: []  Yes  [x]  No     Date Range for reporting period:  Beginnin2023  Ending: end of POC    Progress report due (10 Rx/or 30 days whichever is less): visit #18 or 70    Recertification due (POC duration/ or 90 days whichever is less): visit #18 or 23    Visit # Insurance Allowable Auth required?  Date Range    +  60 combined all therapy discliplines []  Yes  [x]  No N/a         Latex Allergy:  [x]No      []Yes  Pacemaker:  [x] No       [] Yes     Preferred Language for Healthcare:   [x]English       []other:    Pain level:  0/10, L anterior shoulder     SUBJECTIVE:   Patient reports she is very fearful of using her left shoulder due to pain    Functional Disability Index:  7/10/22: Quick DASH: total score- 44, disability score- 75%     OBJECTIVE: See eval     AROM     L R   Shoulder Flexion  7/10/23: 0-72 with shoulder elevation and posterior lean   limited to 40 degrees due to pain  wnl   Elbow Flexion  7/10/23: 0-118   2023  0-100    Supination   7/10/23: WFL     Wrist Flexion  7/10/23: 0-10 with hand offweighted     Wrist Extension   7/10/23: 0-21 with hand offweighted       0-30     CMC Abduction 7/10/23: Held in 25*    Composite Flexion (Tip of 3rd Digit to Distal Palmar Crease (cm))  7/10/23: 1 cm                          Hand Assessment Left  Right  Comments    9

## 2023-09-14 ENCOUNTER — HOSPITAL ENCOUNTER (OUTPATIENT)
Dept: OCCUPATIONAL THERAPY | Age: 68
Setting detail: THERAPIES SERIES
Discharge: HOME | End: 2023-09-14
Payer: MEDICARE

## 2023-09-14 PROCEDURE — 97112 NEUROMUSCULAR REEDUCATION: CPT

## 2023-09-14 PROCEDURE — 97530 THERAPEUTIC ACTIVITIES: CPT

## 2023-09-14 NOTE — PROGRESS NOTES
5 [facility] Occupational Therapy  30 [facility] [address] [place] [address], [facility]  Phone: [phone]   Fax: [phone]      Occupational Therapy Daily Treatment Note  Date:  2023    Patient: Zelalem Russo   : 1955   MRN: [id number]  Referring Physician: Dr. Didier Middleton MD           Medical Diagnosis Information:  left hemiplegia    Date of Injury: 23  Date of Surgery:  n/a                                      Insurance information: [place] of care sent to provider:      []Faxed   [x]Co-signature    (attempts: 1[] 2[] 3[])       Progress Report: [x]  Yes  []  No     Date Range for reporting period:  Beginnin2023  Ending: end of POC    Progress report due (10 Rx/or 30 days whichever is less): visit #30 or 71    Recertification due (POC duration/ or 90 days whichever is less): visit #30 or 23    Visit # Insurance Allowable Auth required? Date Range    +  60 combined all therapy discliplines []  Yes  [x]  No N/a         Latex Allergy:  [x]No      []Yes  Pacemaker:  [x] No       [] Yes     Preferred Language for Healthcare:   [x]English       []other:    Pain level:  0/10, L anterior shoulder     SUBJECTIVE:   Pt reports she has been doing some light massage on her hand for circulatory benefits.      Functional Disability Index:  7/10/22: Quick DASH: total score- 44, disability score- 75%     OBJECTIVE: See eval     AROM     L R   Shoulder Flexion  7/10/23: 0-72 with shoulder elevation and posterior lean  23: limited to 40 degrees due to pain  wnl   Elbow Flexion  7/10/23: 0-118   23: 0-100  23: 0-120    Supination   7/10/23: WFL     Wrist Flexion  7/10/23: 0-10 with hand offweighted     Wrist Extension   7/10/23: 0-21 with hand offweighted  23: 0-30     CMC Abduction 7/10/23: Held in 25*    Composite Flexion (Tip of 3rd Digit to Distal Palmar Crease (cm))  7/10/23: 1 cm   23: 1 cm

## 2023-09-18 ENCOUNTER — HOSPITAL ENCOUNTER (OUTPATIENT)
Dept: OCCUPATIONAL THERAPY | Age: 68
Setting detail: THERAPIES SERIES
Discharge: HOME | End: 2023-09-18
Payer: MEDICARE

## 2023-09-18 PROCEDURE — 97535 SELF CARE MNGMENT TRAINING: CPT

## 2023-09-18 PROCEDURE — 97110 THERAPEUTIC EXERCISES: CPT

## 2023-09-18 PROCEDURE — 97112 NEUROMUSCULAR REEDUCATION: CPT

## 2023-09-18 NOTE — PROGRESS NOTES
[phone]) x     [] KSGVU(24571)  [x] NMR (84559) x   2  [] Estim (attended) (86542)   [] Manual ([id number]) x    [] US (98758)  [x] TA (74349) x    1 [] Paraffin (96852)  [] ADL  ([phone]) x    [] Splint/L code:    [] Estim (unattended) (22 141239)  [] Fluidotherapy (07323)  [] Other:    GOALS:    Patient stated goal: improve function in left ue and drive again  [x] Progressing: [] Met: [] Not Met: [] Adjusted     Therapist goals for Patient:   Short Term Goals: To be achieved in: 30 days  1. Pt will use left ue as functional stabilizer during games with family   [x] Progressing: [] Met: [] Not Met: [] Adjusted  2. Pt will demonstrate use of left ue as bimanual assist carrying objects during ADL and IADL  [x] Progressing: [] Met: [] Not Met: [] Adjusted     Long Term Goals: To be achieved by discharge   1. Pt will report a QuickDASH Symptom Severity Scale score of  50% or less indicating increased safety and functional independence in desired occupational pursuits by discharge. [x] Progressing: [] Met: [] Not Met: [] Adjusted       Progression Towards Functional goals:  [] Patient is progressing as expected towards functional goals listed. [x] Progression is slowed due to complexities listed. [] Progression has been slowed due to co-morbidities. [] Plan just implemented, too soon to assess goals progression  [] All goals are met  [] Other:     ASSESSMENT:   Pt has made some improvement in hand strength and coordination with shoulder pain and weakness from subluxation influencing functional use. Continue targeting LUE proximal stability and hand coordination.      Treatment/Activity Tolerance:  [x] Patient tolerated treatment well [] Patient limited by fatigue  [] Patient limited by pain  [] Patient limited by other medical complications  [] Other:     Prognosis: [x] Good [] Fair  [] Poor    Patient Requires Follow-up: [x] Yes  [] No    PLAN: See eval  [x] Continue per plan of care [] Alter current plan (see comments)  []

## 2023-09-21 ENCOUNTER — HOSPITAL ENCOUNTER (OUTPATIENT)
Dept: OCCUPATIONAL THERAPY | Age: 68
Setting detail: THERAPIES SERIES
Discharge: HOME | End: 2023-09-21
Payer: MEDICARE

## 2023-09-21 PROCEDURE — 97530 THERAPEUTIC ACTIVITIES: CPT

## 2023-09-21 PROCEDURE — 97112 NEUROMUSCULAR REEDUCATION: CPT

## 2023-09-21 NOTE — FLOWSHEET NOTE
975 [facility] Occupational Therapy  30 [facility], [address] [place] [address], [address]  Phone: [phone]   Fax: [phone]      Occupational Therapy Daily Treatment Note  Date:  2023    Patient: Vimal Salas   : 1955   MRN: [id number]  Referring Physician: Dr. Regine Brizuela MD           Medical Diagnosis Information:  left hemiplegia    Date of Injury: 23  Date of Surgery:  n/a                                      Insurance information: [place] [employer] of care sent to provider:      []Faxed   [x]Co-signature    (attempts: 1[] 2[] 3[])       Progress Report: []  Yes  [x]  No     Date Range for reporting period:  Beginnin2023  Ending: end of POC    Progress report due (10 Rx/or 30 days whichever is less): visit #30 or     Recertification due (POC duration/ or 90 days whichever is less): visit #30 or 23    Visit # Insurance Allowable Auth required? Date Range    +  60 combined all therapy discliplines []  Yes  [x]  No N/a         Latex Allergy:  [x]No      []Yes  Pacemaker:  [x] No       [] Yes     Preferred Language for Healthcare:   [x]English       []other:    Pain level:  0/10, L anterior shoulder     SUBJECTIVE:   Pt reports she has been working her hand a lot at home, but it is still a little swollen. Discussed possibility of overuse and importance of incorporating rest breaks and respecting fatigue and/or pain.     Functional Disability Index:  7/10/22: Quick DASH: total score- 44, disability score- 75%     OBJECTIVE: See eval     AROM     L R   Shoulder Flexion  7/10/23: 0-72 with shoulder elevation and posterior lean  23: limited to 40 degrees due to pain  wnl   Elbow Flexion  7/10/23: 0-118   23: 0-100  23: 0-120    Supination   7/10/23: WFL     Wrist Flexion  7/10/23: 0-10 with hand offweighted     Wrist Extension   7/10/23: 0-21 with hand offweighted  23: 0-30     CMC Abduction 7/10/23: Held in 25*    Composite

## 2023-09-25 ENCOUNTER — HOSPITAL ENCOUNTER (OUTPATIENT)
Dept: OCCUPATIONAL THERAPY | Age: 68
Setting detail: THERAPIES SERIES
Discharge: HOME | End: 2023-09-25
Payer: MEDICARE

## 2023-09-25 PROCEDURE — 97112 NEUROMUSCULAR REEDUCATION: CPT

## 2023-09-25 PROCEDURE — 97110 THERAPEUTIC EXERCISES: CPT

## 2023-09-25 NOTE — FLOWSHEET NOTE
975 [facility] Occupational Therapy  30 [facility] [address] [place] [address], [facility]  Phone: [phone]   Fax: [phone]      Occupational Therapy Daily Treatment Note  Date:  2023    Patient: Jose Tracy   : 1955   MRN: [id number]  Referring Physician: Dr. German Gallo MD           Medical Diagnosis Information:  left hemiplegia    Date of Injury: 23  Date of Surgery:  n/a                                      Insurance information: [place] of care sent to provider:      []Faxed   [x]Co-signature    (attempts: 1[] 2[] 3[])       Progress Report: []  Yes  [x]  No     Date Range for reporting period:  Beginnin2023  Ending: end of POC    Progress report due (10 Rx/or 30 days whichever is less): visit #30 or     Recertification due (POC duration/ or 90 days whichever is less): visit #30 or 23    Visit # Insurance Allowable Auth required? Date Range    + 10/12 51/60 combined all therapy discliplines []  Yes  [x]  No N/a          Latex Allergy:  [x]No      []Yes  Pacemaker:  [x] No       [] Yes     Preferred Language for Healthcare:   [x]English       []other:    Pain level:  0/10, L anterior shoulder     SUBJECTIVE:    Patient reports she has been working on her ambulation and incorporating yessenia chi in her daily routine.   She reports she would like to go to 1 time a week so she can stretch her visits out for OT for the rest of the year    Functional Disability Index:  7/10/22: Quick DASH: total score- 44, disability score- 75%     OBJECTIVE: See eval     AROM     L R   Shoulder Flexion  7/10/23: 0-72 with shoulder elevation and posterior lean  23: limited to 40 degrees due to pain  wnl   Elbow Flexion  7/10/23: 0-118   23: 0-100  23: 0-120    Supination   7/10/23: WFL     Wrist Flexion  7/10/23: 0-10 with hand offweighted     Wrist Extension   7/10/23: 0-21 with hand offweighted  23: 0-30     CMC Abduction 7/10/23:

## 2023-09-28 ENCOUNTER — APPOINTMENT (OUTPATIENT)
Dept: OCCUPATIONAL THERAPY | Age: 68
End: 2023-09-28
Payer: MEDICARE

## 2023-10-05 ENCOUNTER — HOSPITAL ENCOUNTER (OUTPATIENT)
Dept: OCCUPATIONAL THERAPY | Age: 68
Setting detail: THERAPIES SERIES
Discharge: HOME | End: 2023-10-05
Payer: MEDICARE

## 2023-10-05 PROCEDURE — 97110 THERAPEUTIC EXERCISES: CPT

## 2023-10-05 PROCEDURE — 97112 NEUROMUSCULAR REEDUCATION: CPT

## 2023-10-11 ENCOUNTER — HOSPITAL ENCOUNTER (OUTPATIENT)
Dept: OCCUPATIONAL THERAPY | Age: 68
Setting detail: THERAPIES SERIES
Discharge: HOME | End: 2023-10-11
Payer: MEDICARE

## 2023-10-11 PROCEDURE — 97110 THERAPEUTIC EXERCISES: CPT

## 2023-10-11 PROCEDURE — 97112 NEUROMUSCULAR REEDUCATION: CPT

## 2023-10-12 NOTE — FLOWSHEET NOTE
Treatment Minutes: 46     [] EVAL (LOW) 21846   [] OT Re-eval (33863)  [] EVAL (MOD) 31995   [] EVAL (HIGH) 71281       [x] Sabas (15394) x   1  [] CXJWV(79583)  [x] NMR (18006) x   2  [] Estim (attended) (50031)   [] Manual ([id number]) x    [] US (09199)  [] TA (92847) x     [] Paraffin (01669)  [] ADL  (65149) x    [] Splint/L code:    [] Estim (unattended) (22 693961)  [] Fluidotherapy (27431)  [] Other:    GOALS:    Patient stated goal: improve function in left ue and drive again  [x] Progressing: [] Met: [] Not Met: [] Adjusted     Therapist goals for Patient:   Short Term Goals: To be achieved in: 30 days  1. Pt will use left ue as functional stabilizer during games with family   [x] Progressing: [] Met: [] Not Met: [] Adjusted  2. Pt will demonstrate use of left ue as bimanual assist carrying objects during ADL and IADL  [x] Progressing: [] Met: [] Not Met: [] Adjusted     Long Term Goals: To be achieved by discharge   1. Pt will report a QuickDASH Symptom Severity Scale score of  50% or less indicating increased safety and functional independence in desired occupational pursuits by discharge. [x] Progressing: [] Met: [] Not Met: [] Adjusted       Progression Towards Functional goals:  [] Patient is progressing as expected towards functional goals listed. [x] Progression is slowed due to complexities listed. [] Progression has been slowed due to co-morbidities. [] Plan just implemented, too soon to assess goals progression  [] All goals are met  [] Other:     ASSESSMENT:   Pt has made some improvement in hand strength and coordination with shoulder pain and weakness from subluxation influencing functional use. Continue targeting LUE proximal stability and hand coordination.      Treatment/Activity Tolerance:  [x] Patient tolerated treatment well [] Patient limited by fatigue  [] Patient limited by pain  [] Patient limited by other medical complications  [] Other:     Prognosis: [x] Good [] Fair  [] Poor    Patient

## 2023-10-18 ENCOUNTER — HOSPITAL ENCOUNTER (OUTPATIENT)
Dept: OCCUPATIONAL THERAPY | Age: 68
Setting detail: THERAPIES SERIES
Discharge: HOME | End: 2023-10-18
Payer: MEDICARE

## 2023-10-18 PROCEDURE — 97530 THERAPEUTIC ACTIVITIES: CPT

## 2023-10-18 PROCEDURE — 97112 NEUROMUSCULAR REEDUCATION: CPT

## 2023-10-20 NOTE — FLOWSHEET NOTE
shoulder elevation. Pt performed scooping through box of mixed beads/beans to locate x12 poker chips to address increasing (L) UE coordination and modulation of forces to scoop the chips accurately. Therapeutic Exercises  Resistance / level Sets/sec Reps Notes                                                                                Neuromuscular Re-ed / Therapeutic Activities                                                 Manual Intervention                                                   Modalities:   Patient education:  Eval, POC, HEP- pt verbalized understanding      Home Exercise Program:  HEP instruction: Written and verbal HEP instructions provided and reviewed:  Patient initiated work on aarom in standing with weight shifts toward direction of functional reach . Initiated importance of hip stabilization with reach. Patient instructed to hold towel at either end and start working on facilitation of external shoulder rotation, abduction and grasp with use of counter force pulling towel away with right hand while holding with left with good performance. Patient then worked on placing cane on counter and holding at each end with both ues and completing side steps moving arm with body to move along the counter.   Patient then worked on taking backward steps and then picking up cane from counter  6/30/23: pressing dowel/trekking stick into corner of wall or counter top while lifting into shoulder flexion    Therapeutic Exercise and NMR:  [x] (10696) Provided verbal/tactile cueing for activities related to strengthening, flexibility, endurance, ROM  for improvements in scapular, scapulothoracic and UE control with self care, reaching, carrying, lifting, house/yardwork, driving/computer work.    [] (12127) Provided verbal/tactile cueing for activities related to improving balance, coordination, kinesthetic sense, posture, motor skill, proprioception  to assist with  scapular, scapulothoracic and UE

## 2023-10-25 ENCOUNTER — HOSPITAL ENCOUNTER (OUTPATIENT)
Dept: OCCUPATIONAL THERAPY | Age: 68
Setting detail: THERAPIES SERIES
Discharge: HOME | End: 2023-10-25
Payer: MEDICARE

## 2023-10-25 PROCEDURE — 97112 NEUROMUSCULAR REEDUCATION: CPT

## 2023-10-25 PROCEDURE — 97530 THERAPEUTIC ACTIVITIES: CPT

## 2023-10-25 NOTE — FLOWSHEET NOTE
and hand coordination. Treatment/Activity Tolerance:  [x] Patient tolerated treatment well [] Patient limited by fatigue  [] Patient limited by pain  [] Patient limited by other medical complications  [] Other:     Prognosis: [x] Good [] Fair  [] Poor    Patient Requires Follow-up: [x] Yes  [] No    PLAN: See eval  [x] Continue per plan of care [] Alter current plan (see comments)  [] Plan of care initiated [] Hold pending MD visit [] Discharge    Electronically signed by:   ASHLYN Mckay/OT, OTR/L- [id number]                       Note: If patient does not return for scheduled/ recommended follow up visits, this note will serve as a discharge from care along with most recent update on progress.

## 2023-11-03 ENCOUNTER — APPOINTMENT (OUTPATIENT)
Dept: OCCUPATIONAL THERAPY | Age: 68
End: 2023-11-03
Payer: MEDICARE

## 2023-11-03 ENCOUNTER — HOSPITAL ENCOUNTER (OUTPATIENT)
Dept: OCCUPATIONAL THERAPY | Age: 68
Setting detail: THERAPIES SERIES
Discharge: HOME | End: 2023-11-03
Payer: MEDICARE

## 2023-11-03 PROCEDURE — 97530 THERAPEUTIC ACTIVITIES: CPT

## 2023-11-03 PROCEDURE — 97112 NEUROMUSCULAR REEDUCATION: CPT

## 2023-11-03 NOTE — FLOWSHEET NOTE
[address] [facility] Occupational Therapy  [address] [facility], [address] [place] [address], [address]  Phone: [phone]   Fax: [phone]      Occupational Therapy Daily Treatment Note  Date:  11/3/2023    Patient: Nikki Romero   : 1955   MRN: [id number]  Referring Physician: Dr. Dagmar Parson MD           Medical Diagnosis Information:  left hemiplegia    Date of Injury: 23  Date of Surgery:  n/a                                      Insurance information: [place] of care sent to provider:      []Faxed   [x]Co-signature    (attempts: 1[] 2[] 3[])       Progress Report: []  Yes  [x]  No     Date Range for reporting period:  Beginnin2023  Ending: end of POC    Progress report due (10 Rx/or 30 days whichever is less): visit #30 or     Recertification due (POC duration/ or 90 days whichever is less): visit #30 or 23    Visit # Insurance Allowable Auth required? Date Range    +    51/60 combined all therapy discliplines []  Yes  [x]  No N/a     Latex Allergy:  [x]No      []Yes  Pacemaker:  [x] No       [] Yes     Preferred Language for Healthcare:   [x]English       []other:    Pain level:  0/10, discomfort in L anterior shoulder     SUBJECTIVE:    Patient reports she has been working on her ambulation and incorporating yessenia chi in her daily routine. She reports she would like to go to 1 time a week so she can stretch her visits out for OT for the rest of the year    10/18: Pt reports she has had to massage her (L) shoulder often today due to some tightness and discomfort. Pt in good spirits and motivated to participate. 10/26: Pt in good spirits this date and motivated to participate. Pt reports that she had two recent falls, reports falling backwards-reports her family was able to assist her to get up. Pt reports she did not hit her head, showed OT a bruise on her body-large bruise on (R) side of abdomen.      Functional Disability Index:  7/10/22:

## 2023-11-08 ENCOUNTER — APPOINTMENT (OUTPATIENT)
Dept: OCCUPATIONAL THERAPY | Age: 68
End: 2023-11-08
Payer: MEDICARE

## 2023-11-09 ENCOUNTER — HOSPITAL ENCOUNTER (OUTPATIENT)
Dept: OCCUPATIONAL THERAPY | Age: 68
Setting detail: THERAPIES SERIES
Discharge: HOME | End: 2023-11-09
Payer: MEDICARE

## 2023-11-09 PROCEDURE — 97168 OT RE-EVAL EST PLAN CARE: CPT

## 2023-11-09 PROCEDURE — 97530 THERAPEUTIC ACTIVITIES: CPT

## 2023-11-09 PROCEDURE — 97110 THERAPEUTIC EXERCISES: CPT

## 2023-11-14 ENCOUNTER — APPOINTMENT (OUTPATIENT)
Dept: OCCUPATIONAL THERAPY | Age: 68
End: 2023-11-14
Payer: MEDICARE

## 2023-11-15 ENCOUNTER — APPOINTMENT (OUTPATIENT)
Dept: OCCUPATIONAL THERAPY | Age: 68
End: 2023-11-15
Payer: MEDICARE

## 2023-11-16 ENCOUNTER — HOSPITAL ENCOUNTER (OUTPATIENT)
Dept: OCCUPATIONAL THERAPY | Age: 68
Setting detail: THERAPIES SERIES
Discharge: HOME | End: 2023-11-16
Payer: MEDICARE

## 2023-11-16 PROCEDURE — 97110 THERAPEUTIC EXERCISES: CPT

## 2023-11-16 PROCEDURE — 97112 NEUROMUSCULAR REEDUCATION: CPT

## 2023-11-16 NOTE — FLOWSHEET NOTE
III, IV (Jourdan, Inf., Post.)  [x] (67275) Provided manual therapy to mobilize soft tissue/joints of cervical/CT, scapular GHJ and UE for the purpose of modulating pain, promoting relaxation,  increasing ROM, reducing/eliminating soft tissue swelling/inflammation/restriction, improving soft tissue extensibility and allowing for proper ROM for normal function with self care, reaching, carrying, lifting, house/yardwork, driving/computer work  [] Comments:    ADL Training:  [] (55657) Provided self-care/home management training related to activities of daily living and compensatory training, and/or use of adaptive equipment   [] Comments:     Splinting:  [] Fabrication of:   [] (89779) Orthotic/Prosthetic Management, subsequent encounter  [] (97507) Orthotic management and training (fitting and assessment)  [] Comments:      Charges:  Timed Code Treatment Minutes: 45   Total Treatment Minutes: 45     [] EVAL (LOW) 50172   [] OT Re-eval (26802)  [] EVAL (MOD) 85112   [] EVAL (HIGH) 27925       [x] Sabas (48891) x   1  [] JDKLL(83951)  [x] NMR (54428) x   2  [] Estim (attended) (44564)   [] Manual ([id number]) x    [] US (03004)  [] TA (96242) x     [] Paraffin (43367)  [] ADL  ([phone]) x    [] Splint/L code:    [] Estim (unattended) (22 649385)  [] Fluidotherapy (01795)  [] Other:    GOALS:    Patient stated goal: improve function in left ue and drive again. Patient is working on endurance on the steering wheel but she starts to fatigue and shoulder gets painful  [x] Progressing: [] Met: [] Not Met: [] Adjusted     Therapist goals for Patient:   Short Term Goals: To be achieved in: 30 days  1. Pt will use left ue as functional stabilizer during games with family   [] Progressing: [x] Met: [] Not Met: [] Adjusted  2. Pt will demonstrate use of left ue as bimanual assist carrying objects during ADL and IADL  [] Progressing: [x] Met: [] Not Met: [] Adjusted     Long Term Goals: To be achieved by discharge   1.  Pt will report a

## 2023-11-22 ENCOUNTER — APPOINTMENT (OUTPATIENT)
Dept: OCCUPATIONAL THERAPY | Age: 68
End: 2023-11-22
Payer: MEDICARE

## 2023-11-29 ENCOUNTER — APPOINTMENT (OUTPATIENT)
Dept: OCCUPATIONAL THERAPY | Age: 68
End: 2023-11-29
Payer: MEDICARE

## 2023-11-30 ENCOUNTER — HOSPITAL ENCOUNTER (OUTPATIENT)
Dept: OCCUPATIONAL THERAPY | Age: 68
Setting detail: THERAPIES SERIES
Discharge: HOME | End: 2023-11-30
Payer: MEDICARE

## 2023-11-30 PROCEDURE — 97112 NEUROMUSCULAR REEDUCATION: CPT

## 2023-11-30 PROCEDURE — 97110 THERAPEUTIC EXERCISES: CPT

## 2023-11-30 NOTE — FLOWSHEET NOTE
[address] [facility] Occupational Therapy  30 [facility] [address] [place] [address], [facility] [address]  Phone: [phone]   Fax: [phone]      Occupational Therapy Daily Treatment Note  Date:  2023    Patient: Milli Mcmahon   : 1955   MRN: [id number]  Referring Physician: Dr. Ap Urias MD           Medical Diagnosis Information:  left hemiplegia    Date of Injury: 23  Date of Surgery:  n/a                                      Insurance information: [place] of care sent to provider:      []Faxed   [x]Co-signature    (attempts: 1[] 2[] 3[])       Progress Report: []  Yes  [x]  No     Date Range for reporting period:  Beginnin2023  Ending: end of POC    Progress report due (10 Rx/or 30 days whichever is less): visit #30 or     Recertification due (POC duration/ or 90 days whichever is less): visit #30 or 23    Visit # Insurance Allowable Auth required?  Date Range    + 12/15   56/60 combined all therapy discliplines []  Yes  [x]  No N/a     Latex Allergy:  [x]No      []Yes  Pacemaker:  [x] No       [] Yes     Preferred Language for Healthcare:   [x]English       []other:    Pain level:  mild discomfort in left upper extremity     SUBJECTIVE:    Pa     Functional Disability Index:  7/10/22: Quick DASH: total score- 44, disability score- 75%  2023 improved to 37    OBJECTIVE: See eval     AROM     L R   Shoulder Flexion  7/10/23: 0-72 with shoulder elevation and posterior lean  23: limited to 40 degrees due to pain  2023   90 degrees    wnl   Elbow Flexion  7/10/23: 0-118   23: 0-100  23: 0-120  2023      Supination   7/10/23: WFL     Wrist Flexion  7/10/23: 0-10 with hand         Wrist Extension   7/10/23: 0-21 with hand offweighted  23: 0-30  2023 40       CMC Abduction 7/10/23: Held in 25*  2023 improved to 60       Composite Flexion (Tip of 3rd Digit to Distal Palmar Crease (cm))  7/10/23: 1 cm

## 2023-12-07 ENCOUNTER — HOSPITAL ENCOUNTER (OUTPATIENT)
Dept: OCCUPATIONAL THERAPY | Age: 68
Setting detail: THERAPIES SERIES
Discharge: HOME | End: 2023-12-07
Payer: MEDICARE

## 2023-12-07 PROCEDURE — 97110 THERAPEUTIC EXERCISES: CPT

## 2023-12-07 PROCEDURE — 97112 NEUROMUSCULAR REEDUCATION: CPT

## 2023-12-07 PROCEDURE — 97168 OT RE-EVAL EST PLAN CARE: CPT

## 2024-12-30 ENCOUNTER — HOSPITAL ENCOUNTER (OUTPATIENT)
Dept: OCCUPATIONAL THERAPY | Age: 69
Setting detail: THERAPIES SERIES
Discharge: HOME | End: 2024-12-30
Payer: MEDICARE

## 2024-12-30 PROCEDURE — 97537 COMMUNITY/WORK REINTEGRATION: CPT

## 2024-12-30 PROCEDURE — 97165 OT EVAL LOW COMPLEX 30 MIN: CPT

## 2024-12-30 NOTE — PROGRESS NOTES
Outpatient Occupational Therapy  [] [facility]   Phone: [phone]   Fax: [phone]   [x] [facility]  Phone: [phone]   Fax: [phone]  [] [facility]   Phone: [phone]   Fax: [phone]      To:  Dr. Bailey      Patient: Marah Mosher  : 1955  MRN: [id number]  Evaluation Date: 2024      Diagnosis Information: CVA            Occupational Therapy Certification/Re-Certification Form  Dear Dr. Bailey,  The following patient has been evaluated for occupational therapy services and for therapy to continue, Medicare requires monthly physician review of the treatment plan. Please review the attached evaluation and/or summary of the patient's plan of care, and verify that you agree therapy should continue by signing the attached document and sending it back to our office.    Plan of Care/Treatment to date:  [] Therapeutic Exercise   [] Modalities:  [] Therapeutic Activity    [] Ultrasound  [] Electrical Stimulation   [] Activities of Daily Living    [] Fluidotherapy [] Kinesiotaping  [] Neuromuscular Re-education   [] Iontophoresis [] Coldpack/hotpack   [] Instruction in HEP     [] Contrast Bath  [] Manual Therapy     Other:  [] Aquatic Therapy      [x]  training sessions and   continue to assess need for spinner knob      Frequency/Duration:  # Days per week: [x] 1 day # Weeks: [] 1 week [] 5 weeks      [] 2 days   [] 2 weeks [] 6 weeks     [] 3 days   [x] 3 weeks [] 7 weeks     [] 4 days   [] 4 weeks [] 8 weeks    Rehab Potential: [] excellent [x] good [] fair  [] poor       Electronically signed by:  PIERRE Ruffin, KAR      If you have any questions or concerns, please don't hesitate to call.  Thank you for your referral.      Physician Signature:________________________________Date:__________________  By signing above, therapist’s plan is approved by physician      
spatial awareness, visual, motor and executive functioning skills needed to complete regular parking.  4.  Pt will enhance/develop visual spatial awareness, visual, motor and executive functioning skills needed to complete manueverability course/parallel parking.  5.  Pt will enhance/develop visual spatial awareness, visual, motor and executive functioning skills needed for safe felipe changes.    6.  Pt will enhance/develop visual spatial awareness, visual, motor and executive functioning skills needed to complete driving on the expressway.  7.  Pt will demonstrate safe use of vehicle modifications. Further assess need for spinner knob.        PEBBLES Ruffin/L, CDRS, LDI   Certified  Rehabilitation Specialist

## 2025-01-14 ENCOUNTER — HOSPITAL ENCOUNTER (OUTPATIENT)
Age: 70
Setting detail: THERAPIES SERIES
Discharge: HOME | End: 2025-01-14
Payer: COMMERCIAL

## 2025-01-14 PROCEDURE — 97537 COMMUNITY/WORK REINTEGRATION: CPT
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
## 2025-01-21 ENCOUNTER — APPOINTMENT (OUTPATIENT)
Age: 70
End: 2025-01-21
Payer: COMMERCIAL

## 2025-02-04 ENCOUNTER — HOSPITAL ENCOUNTER (OUTPATIENT)
Age: 70
Setting detail: THERAPIES SERIES
Discharge: HOME | End: 2025-02-04
Payer: COMMERCIAL

## 2025-02-04 PROCEDURE — 97537 COMMUNITY/WORK REINTEGRATION: CPT
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
## 2025-02-10 ENCOUNTER — APPOINTMENT (OUTPATIENT)
Age: 70
End: 2025-02-10
Payer: COMMERCIAL

## 2025-02-19 ENCOUNTER — HOSPITAL ENCOUNTER (OUTPATIENT)
Age: 70
Setting detail: THERAPIES SERIES
Discharge: HOME | End: 2025-02-19
Payer: COMMERCIAL

## 2025-02-19 PROCEDURE — 97537 COMMUNITY/WORK REINTEGRATION: CPT

## 2025-02-19 NOTE — FLOWSHEET NOTE
Minutes: 120    Billed Units: 8    CPT: 28296 OT Work Conditioning Units: ___8___    Treatment/Activity Tolerance:  Good    Prognosis: Good    Patient Requires Follow-up: yes    Plan:   Continue per plan of care:____x_______   Alter current plan:_________   Discharge:______________    Plan for Next Session: Expressway, parking in store parking lot        DEDE Ruffin, CDRS, LDI   Certified  Rehabilitation Specialist

## 2025-07-10 ENCOUNTER — HOSPITAL ENCOUNTER (OUTPATIENT)
Age: 70
Setting detail: THERAPIES SERIES
Discharge: HOME | End: 2025-07-10
Payer: COMMERCIAL

## 2025-07-10 PROCEDURE — 97537 COMMUNITY/WORK REINTEGRATION: CPT

## 2025-07-10 NOTE — FLOWSHEET NOTE
towards goals:   Pt has demonstrated improvement of LUE function as she was able to assist with steering as well as use her LUE to operate the turn signal.  This date pt demonstrated decreased attention to the road at times.    Summary: Pt has not been to training since February due to the training vehicle not being available.  Pt demonstrated improvement with steering but demonstrated decreased attention to task.  Pt educated on need to improve her attention and maintain her felipe.    Prognosis for POC: Good    Patient requires continued skilled intervention: Yes    Prognosis/Rehab Potential: Good       Tolerance of evaluation/treatment: Good      Plan for Next Session:   Continue in traffic and parking.  Attempt expressway unless she decides she does not want to drive on the expressway.         Time In: 1300    Time Out: 1500    CHARGE CAPTURE      CHARGE GRID   CPT Code (Timed) Minutes # CPT Code (Untimed) #      Therex (34758)      Eval:LOW (94157 - Typically 20 minutes face-to-face)       Ther. Act (03507)     Re-Eval (35348)       Sensory Integration (98429)     Estim Unattended (21150)       Self Care/Home Manage (41680)     Parraffin (30652)       Cognitive Function (24228)     Fluidotherapy (34434)       Cognitive Function (62954): each    additional 15 minutes     Dry Needle 1-2 muscle (89525)       Neuromusc. Re-ed (60854)     Dry Needle 3+ muscle (20561)       Manual (44853)     VASO (94806)       Aquatic Therex (62278)     Group Therapy (60238)       Iontophoresis (54491)           Ultrasound (03518)            Estim Attended (36022)            Other:     Other:      Work Conditioning (99460) 120 8       Total Timed Code Tx Minutes 120 8               Total Treatment Minutes 120       PEBBLES Ruffin/L, CDRS, LDI   Certified  Rehabilitation Specialist

## 2025-08-26 ENCOUNTER — APPOINTMENT (OUTPATIENT)
Age: 70
End: 2025-08-26
Payer: COMMERCIAL

## 2025-08-28 ENCOUNTER — HOSPITAL ENCOUNTER (OUTPATIENT)
Age: 70
Setting detail: THERAPIES SERIES
Discharge: HOME | End: 2025-08-28
Payer: COMMERCIAL

## 2025-08-28 PROCEDURE — 97537 COMMUNITY/WORK REINTEGRATION: CPT

## (undated) DEVICE — SOLUTION IV IRRIG WATER 500ML POUR BRL ST 2F7113

## (undated) DEVICE — PROCEDURE KIT ENDOSCP CUST

## (undated) DEVICE — BW-412T DISP COMBO CLEANING BRUSH: Brand: SINGLE USE COMBINATION CLEANING BRUSH

## (undated) DEVICE — FORCEPS BX L240CM WRK CHN 2.8MM STD CAP W/ NDL MIC MESH

## (undated) DEVICE — SET VLV 3 PC AWS DISPOSABLE GRDIAN SCOPEVALET